# Patient Record
Sex: FEMALE | Race: WHITE | Employment: OTHER | URBAN - METROPOLITAN AREA
[De-identification: names, ages, dates, MRNs, and addresses within clinical notes are randomized per-mention and may not be internally consistent; named-entity substitution may affect disease eponyms.]

---

## 2017-12-12 ENCOUNTER — OFFICE VISIT (OUTPATIENT)
Dept: INTERNAL MEDICINE CLINIC | Age: 67
End: 2017-12-12

## 2017-12-12 VITALS
BODY MASS INDEX: 33.4 KG/M2 | OXYGEN SATURATION: 98 % | RESPIRATION RATE: 18 BRPM | HEART RATE: 69 BPM | WEIGHT: 170.13 LBS | DIASTOLIC BLOOD PRESSURE: 79 MMHG | HEIGHT: 60 IN | SYSTOLIC BLOOD PRESSURE: 129 MMHG | TEMPERATURE: 98 F

## 2017-12-12 DIAGNOSIS — E03.9 ACQUIRED HYPOTHYROIDISM: Chronic | ICD-10-CM

## 2017-12-12 DIAGNOSIS — Z23 ENCOUNTER FOR IMMUNIZATION: ICD-10-CM

## 2017-12-12 DIAGNOSIS — E78.00 PURE HYPERCHOLESTEROLEMIA: ICD-10-CM

## 2017-12-12 DIAGNOSIS — E55.9 VITAMIN D DEFICIENCY: Chronic | ICD-10-CM

## 2017-12-12 DIAGNOSIS — G25.0 FAMILIAL TREMOR: ICD-10-CM

## 2017-12-12 DIAGNOSIS — M54.32 SCIATICA OF LEFT SIDE: Primary | ICD-10-CM

## 2017-12-12 DIAGNOSIS — R73.09 ELEVATED GLUCOSE: ICD-10-CM

## 2017-12-12 RX ORDER — MULTIVITAMIN
1000 TABLET ORAL DAILY
COMMUNITY

## 2017-12-12 RX ORDER — LEVOTHYROXINE SODIUM 50 UG/1
50 TABLET ORAL
COMMUNITY
Start: 2017-11-09 | End: 2018-05-10 | Stop reason: SDUPTHER

## 2017-12-12 RX ORDER — CHLORHEXIDINE GLUCONATE 1.2 MG/ML
RINSE ORAL
COMMUNITY
Start: 2017-10-23 | End: 2017-12-12 | Stop reason: ALTCHOICE

## 2017-12-12 RX ORDER — CHOLECALCIFEROL (VITAMIN D3) 125 MCG
4000 CAPSULE ORAL DAILY
COMMUNITY

## 2017-12-12 NOTE — MR AVS SNAPSHOT
Visit Information Date & Time Provider Department Dept. Phone Encounter #  
 12/12/2017  1:30 PM Bolivar Gandhi MD [facility] [address] [id number] Follow-up Instructions Return in about 1 year (around 12/12/2018). Upcoming Health Maintenance Date Due Hepatitis C Screening 1950 DTaP/Tdap/Td series (1 - Tdap) 11/4/1969 FOBT Q 1 YEAR AGE 50-75 11/4/1998 ZOSTER VACCINE AGE 60> 9/4/2008 GLAUCOMA SCREENING Q2Y 11/4/2013 OSTEOPOROSIS SCREENING (DEXA) 11/4/2013 Pneumococcal 65+ Low/Medium Risk (1 of 2 - PCV13) 11/4/2013 MEDICARE YEARLY EXAM 11/4/2013 Influenza Age 5 to Adult 8/1/2017 Allergies as of 12/12/2017  Review Complete On: 12/12/2017 By: Bolivar Gandhi MD  
  
 Severity Noted Reaction Type Reactions Avelox [Moxifloxacin]  03/09/2016    Shortness of Breath, Palpitations Minocycline  12/12/2017    Other (comments) dysphagia Current Immunizations  Reviewed on 12/12/2017 Name Date Influenza High Dose Vaccine PF  Incomplete Pneumococcal Conjugate (PCV-13) 11/1/2017 Reviewed by Bolivar Gandhi MD on 12/12/2017 at  1:56 PM  
You Were Diagnosed With   
  
 Codes Comments Encounter for immunization     ICD-10-CM: R11 ICD-9-CM: V03.89 Vitals BP Pulse Temp Resp Height(growth percentile) Weight(growth percentile) 129/79 (BP 1 Location: Left arm, BP Patient Position: Sitting) 69 98 °F (36.7 °C) (Oral) 18 5' (1.524 m) 170 lb 2 oz (77.2 kg) SpO2 BMI OB Status Smoking Status 98% 33.23 kg/m2 Postmenopausal Never Smoker Vitals History BMI and BSA Data Body Mass Index Body Surface Area  
 33.23 kg/m 2 1.81 m 2 Preferred Pharmacy Pharmacy Name Phone [id number] [address] , [address] [place] [address]  [phone] Your Updated Medication List  
  
   
This list is accurate as of: 12/12/17  2:10 PM.  Always use your most recent med list.  
  
  
  
  
 BIOTIN (BULK) Take 5,000 mcg by mouth daily. CINNAMON 500 mg Cap Generic drug:  cinnamon bark Take 1,000 mg by mouth daily. levothyroxine 50 mcg tablet Commonly known as:  SYNTHROID Take 50 mcg by mouth Daily (before breakfast). VITAMIN D3 2,000 unit Tab Generic drug:  cholecalciferol (vitamin D3) Take 4,000 Units by mouth daily. We Performed the Following INFLUENZA VIRUS VACCINE, HIGH DOSE SEASONAL, PRESERVATIVE FREE [34458 CPT(R)] Follow-up Instructions Return in about 1 year (around 12/12/2018). Introducing [facility]! [facility] introduces [employer] patient portal. Now you can access parts of your medical record, email your doctor's office, and request medication refills online. 1. In your internet browser, go to [email or web address] 2. Click on the First Time User? Click Here link in the Sign In box. You will see the New Member Sign Up page. 3. Enter your [employer] Access Code exactly as it appears below. You will not need to use this code after youve completed the sign-up process. If you do not sign up before the expiration date, you must request a new code. · [employer] Access Code: [email or web address] Expires: 3/12/2018  1:15 PM 
 
4. Enter the last four digits of your Social Security Number (xxxx) and Date of Birth (mm/dd/yyyy) as indicated and click Submit. You will be taken to the next sign-up page. 5. Create a [employer] ID. This will be your [employer] login ID and cannot be changed, so think of one that is secure and easy to remember. 6. Create a [employer] password. You can change your password at any time. 7. Enter your Password Reset Question and Answer. This can be used at a later time if you forget your password. 8. Enter your e-mail address. You will receive e-mail notification when new information is available in [address] 9. Click Sign Up. You can now view and download portions of your medical record. 10. Click the Download Summary menu link to download a portable copy of your medical information. If you have questions, please visit the Frequently Asked Questions section of the Inter[employer] website. Remember, [employer] is NOT to be used for urgent needs. For medical emergencies, dial 911. Now available from your iPhone and Android! Please provide this summary of care documentation to your next provider. Your primary care clinician is listed as Alicia Choi. If you have any questions after today's visit, please call [phone].

## 2017-12-12 NOTE — PROGRESS NOTES
HISTORY OF PRESENT ILLNESS  Kiya Molina is a 69 y.o. female. HPI  new to my practice  Transferring care from Dr. John Briones. Last evaluated there 2  months ago. Previous medical records are not available for my review at this visit. Thyroid Disease:  Kiya Molina is a 69 y.o. female here for follow up of hypothyroidism. No results found for: TSH, TSH2, TSH3, TSHP, TSHEXT  Residual symptoms weight loss. mild  she denies denies fatigue, weight changes, heat/cold intolerance, bowel/skin changes or CVS symptoms  Thyroid medication has been unchanged since last medication check and labs. History or L sided sciatica - recurrent over many years. 3-4 times/ year. Burning in L buttocks. She has been thru PT.  5-6 years ago. No weakness, numbness, tingling in L leg. Prediabetes: Kiya Molina is here for follow up of elevated fasting sugars and prediabetes. she has been counseled before on low carb/ low concentrated sweets diet and is not following that plan. further diabetic ROS: no polyuria or polydipsia, no chest pain, dyspnea or TIAs . No results found for: GLU, GLUCPOC  No results found for: HBA1C, HGBE8, KAH9TWFZ, SVX2XWLF  Last Point of Care HGB A1C  No results found for: [id number]     Hyperlipidemia:  Kiya Molina is following up on her dyslipidemia. Cardiovascular risks for her are: LDL goal is under 130.    Currently she takes cinnamin  No results found for: CHOL, CHOLX, CHLST, CHOLV, 932306, HDL, LDL, LDLC, DLDLP, TGLX, TRIGL, TRIGP, CHHD, CHHDX  No results found for: GPT, ALT, SGOT, GGT, GGTP, AP, APIT, APX, CBIL, TBIL, TBILI              Patient Active Problem List   Diagnosis Code    Acquired hypothyroidism E03.9    Vitamin D deficiency E55.9     Past Surgical History:   Procedure Laterality Date    HX  SECTION      x2    HX DILATION AND CURETTAGE      x1    HX GYN  2015    RAMSEY Valentin     Social History     Social History    Marital status:      Spouse name: N/A  Number of children: N/A    Years of education: N/A     Occupational History    Not on file. Social History Main Topics    Smoking status: Never Smoker    Smokeless tobacco: Never Used    Alcohol use No    Drug use: No    Sexual activity: Yes     Partners: Male     Other Topics Concern    Not on file     Social History Narrative     Family History   Problem Relation Age of Onset    Hypertension Mother     Arthritis-osteo Mother     Elevated Lipids Mother     Hypertension Father     Arthritis-osteo Father     Elevated Lipids Sister     Hypertension Sister     Arthritis-osteo Brother     Elevated Lipids Brother     Elevated Lipids Sister     Elevated Lipids Sister     Arthritis-osteo Sister     Hypertension Sister     Thyroid Disease Sister     Cancer Neg Hx     Diabetes Neg Hx      Current Outpatient Prescriptions   Medication Sig    levothyroxine (SYNTHROID) 50 mcg tablet Take 50 mcg by mouth Daily (before breakfast).  cholecalciferol, vitamin D3, (VITAMIN D3) 2,000 unit tab Take 4,000 Units by mouth daily.  BIOTIN, BULK, Take 5,000 mcg by mouth daily.  cinnamon bark (CINNAMON) 500 mg cap Take 1,000 mg by mouth daily. No current facility-administered medications for this visit. Allergies   Allergen Reactions    Avelox [Moxifloxacin] Shortness of Breath and Palpitations    Minocycline Other (comments)     dysphagia     Immunization History   Administered Date(s) Administered    Pneumococcal Conjugate (PCV-13) 11/01/2017           Review of Systems   Constitutional: Negative for malaise/fatigue and weight loss. HENT: Negative for congestion and sore throat. Eyes: Negative for blurred vision. Respiratory: Negative for cough, shortness of breath and wheezing. Cardiovascular: Negative for chest pain, palpitations and leg swelling. Gastrointestinal: Negative for abdominal pain, blood in stool, constipation, diarrhea, heartburn, nausea and vomiting. Genitourinary: Negative for dysuria and hematuria. Musculoskeletal: Positive for back pain (L sided low back to buttock pain intermittant for years). Negative for joint pain. Skin: Negative for itching and rash. Neurological: Positive for tremors (head  chronic and diagnosed as familial tremor by Dr. Eva Briseno). Negative for dizziness, tingling, sensory change, focal weakness and headaches. Endo/Heme/Allergies: Negative for environmental allergies. Does not bruise/bleed easily. Psychiatric/Behavioral: Negative for depression and substance abuse. The patient is not nervous/anxious and does not have insomnia. Physical Exam   Constitutional: She appears well-developed and well-nourished. No distress. /79 (BP 1 Location: Left arm, BP Patient Position: Sitting)  Pulse 69  Temp 98 °F (36.7 °C) (Oral)   Resp 18  Ht 5' (1.524 m)  Wt 170 lb 2 oz (77.2 kg)  SpO2 98%  BMI 33.23 kg/m2Body mass index is 33.23 kg/(m^2). HENT:   Mouth/Throat: Oropharynx is clear and moist.   Eyes: Pupils are equal, round, and reactive to light. No scleral icterus. Neck: No JVD present. Carotid bruit is not present. No thyromegaly present. Cardiovascular: Normal rate, regular rhythm, normal heart sounds and intact distal pulses. Pulmonary/Chest: Effort normal and breath sounds normal.   Musculoskeletal: She exhibits no edema. Neurological: She is alert. She displays tremor (intermittant fine head tremor). She displays no atrophy. She exhibits normal muscle tone. Coordination and gait normal.   Skin: Skin is warm and dry. She is not diaphoretic. Psychiatric: She has a normal mood and affect. Her behavior is normal. Judgment and thought content normal.   Nursing note and vitals reviewed. ASSESSMENT and PLAN  Diagnoses and all orders for this visit:    1. Sciatica of left side  Consider return to PT for stretching exercises, Diane exercises and or yoga.       2. Encounter for immunization  - Influenza virus vaccine ([address]) 65 years and older (04362)    3. Acquired hypothyroidism - Well controlled and stable. her medications were reviewed and refilled where necessary as noted below. Labs ordered as noted. Get last labs from Dr. Kelin Forrester    4. Vitamin D deficiency  Get last lab check from Dr. Kelin Forrester    5. Pure hypercholesterolemia -recheck on no medications. I suspect her pooled cohort 10 year risk calculation is low and not indicating need for statin  -     LIPID PANEL    6. Elevated glucose - recheck. We discussed diet management of her prediabetes or diabetes. she is advised to reduce complex carbs/ starches, avoid breads if possible and avoid concentrated sweets and drinks. -     HEMOGLOBIN A1C WITH EAG    7. Familial tremor - she declines medication for this as it is not bothersome for her. Follow-up Disposition:  Return in about 1 year (around 12/12/2018).

## 2017-12-21 ENCOUNTER — OFFICE VISIT (OUTPATIENT)
Dept: INTERNAL MEDICINE CLINIC | Age: 67
End: 2017-12-21

## 2017-12-21 VITALS
WEIGHT: 171 LBS | DIASTOLIC BLOOD PRESSURE: 74 MMHG | SYSTOLIC BLOOD PRESSURE: 128 MMHG | OXYGEN SATURATION: 98 % | RESPIRATION RATE: 16 BRPM | TEMPERATURE: 97.9 F | HEART RATE: 57 BPM | BODY MASS INDEX: 33.57 KG/M2 | HEIGHT: 60 IN

## 2017-12-21 DIAGNOSIS — J32.9 SINUSITIS, UNSPECIFIED CHRONICITY, UNSPECIFIED LOCATION: Primary | ICD-10-CM

## 2017-12-21 DIAGNOSIS — J32.9 SINUSITIS, UNSPECIFIED CHRONICITY, UNSPECIFIED LOCATION: ICD-10-CM

## 2017-12-21 RX ORDER — AMOXICILLIN AND CLAVULANATE POTASSIUM 875; 125 MG/1; MG/1
1 TABLET, FILM COATED ORAL EVERY 12 HOURS
Qty: 20 TAB | Refills: 1 | Status: SHIPPED | OUTPATIENT
Start: 2017-12-21 | End: 2017-12-31

## 2017-12-21 RX ORDER — AMOXICILLIN AND CLAVULANATE POTASSIUM 875; 125 MG/1; MG/1
1 TABLET, FILM COATED ORAL EVERY 12 HOURS
Qty: 20 TAB | Refills: 0 | Status: SHIPPED | OUTPATIENT
Start: 2017-12-21 | End: 2017-12-21 | Stop reason: SDUPTHER

## 2017-12-21 NOTE — MR AVS SNAPSHOT
Visit Information Date & Time Provider Department Dept. Phone Encounter #  
 12/21/2017 11:15 AM Len Sloan MD [facility] [address] [id number] Upcoming Health Maintenance Date Due Hepatitis C Screening 1950 DTaP/Tdap/Td series (1 - Tdap) 11/4/1969 FOBT Q 1 YEAR AGE 50-75 11/4/1998 ZOSTER VACCINE AGE 60> 9/4/2008 GLAUCOMA SCREENING Q2Y 11/4/2013 OSTEOPOROSIS SCREENING (DEXA) 11/4/2013 MEDICARE YEARLY EXAM 11/4/2013 Pneumococcal 65+ Low/Medium Risk (2 of 2 - PPSV23) 11/1/2018 Allergies as of 12/21/2017  Review Complete On: 89/74/4767 By: Kevin Roach Severity Noted Reaction Type Reactions Avelox [Moxifloxacin]  03/09/2016    Shortness of Breath, Palpitations Minocycline  12/12/2017    Other (comments) dysphagia Current Immunizations  Reviewed on 12/12/2017 Name Date Influenza High Dose Vaccine PF 12/12/2017 Pneumococcal Conjugate (PCV-13) 11/1/2017 Not reviewed this visit You Were Diagnosed With   
  
 Codes Comments Sinusitis, unspecified chronicity, unspecified location    -  Primary ICD-10-CM: J32.9 ICD-9-CM: 473.9 Vitals BP Pulse Temp Resp Height(growth percentile) Weight(growth percentile) 128/74 (BP 1 Location: Left arm, BP Patient Position: Sitting) (!) 57 97.9 °F (36.6 °C) (Oral) 16 5' (1.524 m) 171 lb (77.6 kg) SpO2 BMI OB Status Smoking Status 98% 33.4 kg/m2 Postmenopausal Never Smoker Vitals History BMI and BSA Data Body Mass Index Body Surface Area  
 33.4 kg/m 2 1.81 m 2 Preferred Pharmacy Pharmacy Name Phone [id number] [address] , [address] [place] [address]  [phone] Your Updated Medication List  
  
   
This list is accurate as of: 12/21/17 11:53 AM.  Always use your most recent med list.  
  
  
  
  
 amoxicillin-clavulanate 875-125 mg per tablet Commonly known as:  AUGMENTIN  
 Take 1 Tab by mouth every twelve (12) hours for 10 days. BIOTIN (BULK) Take 5,000 mcg by mouth daily. CINNAMON 500 mg Cap Generic drug:  cinnamon bark Take 1,000 mg by mouth daily. guaiFENesin 1,200 mg Ta12 ER tablet Commonly known as:  Almas & Almas Take 1 Tab by mouth two (2) times a day for 10 days. levothyroxine 50 mcg tablet Commonly known as:  SYNTHROID Take 50 mcg by mouth Daily (before breakfast). VITAMIN D3 2,000 unit Tab Generic drug:  cholecalciferol (vitamin D3) Take 4,000 Units by mouth daily. Prescriptions Sent to Pharmacy Refills  
 guaiFENesin (MUCINEX) 1,200 mg Ta12 ER tablet 1 Sig: Take 1 Tab by mouth two (2) times a day for 10 days. Class: Normal  
 Pharmacy: [id number] Ph #: [phone] Route: Oral  
 amoxicillin-clavulanate (AUGMENTIN) 875-125 mg per tablet 0 Sig: Take 1 Tab by mouth every twelve (12) hours for 10 days. Class: Normal  
 Pharmacy: [id number]- [id number] Ph #: [phone] Route: Oral  
  
Patient Instructions Sinusitis: Care Instructions Your Care Instructions Sinusitis is an infection of the lining of the sinus cavities in your head. Sinusitis often follows a cold. It causes pain and pressure in your head and face. In most cases, sinusitis gets better on its own in 1 to 2 weeks. But some mild symptoms may last for several weeks. Sometimes antibiotics are needed. Follow-up care is a key part of your treatment and safety. Be sure to make and go to all appointments, and call your doctor if you are having problems. It's also a good idea to know your test results and keep a list of the medicines you take. How can you care for yourself at home? · Take an over-the-counter pain medicine, such as acetaminophen (Tylenol), ibuprofen (Advil, Motrin), or naproxen (Aleve).  Read and follow all instructions on the label. · If the doctor prescribed antibiotics, take them as directed. Do not stop taking them just because you feel better. You need to take the full course of antibiotics. · Be careful when taking over-the-counter cold or flu medicines and Tylenol at the same time. Many of these medicines have acetaminophen, which is Tylenol. Read the labels to make sure that you are not taking more than the recommended dose. Too much acetaminophen (Tylenol) can be harmful. · Breathe warm, moist air from a steamy shower, a hot bath, or a sink filled with hot water. Avoid cold, dry air. Using a humidifier in your home may help. Follow the directions for cleaning the machine. · Use saline (saltwater) nasal washes to help keep your nasal passages open and wash out mucus and bacteria. You can buy saline nose drops at a grocery store or drugstore. Or you can make your own at home by adding 1 teaspoon of salt and 1 teaspoon of baking soda to 2 cups of distilled water. If you make your own, fill a bulb syringe with the solution, insert the tip into your nostril, and squeeze gently. Lorrin Fraction your nose. · Put a hot, wet towel or a warm gel pack on your face 3 or 4 times a day for 5 to 10 minutes each time. · Try a decongestant nasal spray like oxymetazoline (Afrin). Do not use it for more than 3 days in a row. Using it for more than 3 days can make your congestion worse. When should you call for help? Call your doctor now or seek immediate medical care if: 
? · You have new or worse swelling or redness in your face or around your eyes. ? · You have a new or higher fever. ? Watch closely for changes in your health, and be sure to contact your doctor if: 
? · You have new or worse facial pain. ? · The mucus from your nose becomes thicker (like pus) or has new blood in it. ? · You are not getting better as expected. Where can you learn more? Go to http://maria r-courtney.info/. Enter M560 in the search box to learn more about \"Sinusitis: Care Instructions. \" Current as of: May 12, 2017 Content Version: 11.4 © 9398-1460 Healthwise, [employer]. Care instructions adapted under license by [employer] (which disclaims liability or warranty for this information). If you have questions about a medical condition or this instruction, always ask your healthcare professional. [address] any warranty or liability for your use of this information. Introducing [facility]! [facility] introduces [employer] patient portal. Now you can access parts of your medical record, email your doctor's office, and request medication refills online. 1. In your internet browser, go to [email or web address] 2. Click on the First Time User? Click Here link in the Sign In box. You will see the New Member Sign Up page. 3. Enter your [employer] Access Code exactly as it appears below. You will not need to use this code after youve completed the sign-up process. If you do not sign up before the expiration date, you must request a new code. · [employer] Access Code: [email or web address] Expires: 3/12/2018  1:15 PM 
 
4. Enter the last four digits of your Social Security Number (xxxx) and Date of Birth (mm/dd/yyyy) as indicated and click Submit. You will be taken to the next sign-up page. 5. Create a [employer] ID. This will be your [employer] login ID and cannot be changed, so think of one that is secure and easy to remember. 6. Create a [employer] password. You can change your password at any time. 7. Enter your Password Reset Question and Answer. This can be used at a later time if you forget your password. 8. Enter your e-mail address. You will receive e-mail notification when new information is available in [address] 9. Click Sign Up. You can now view and download portions of your medical record. 10. Click the Download Summary menu link to download a portable copy of your medical information. If you have questions, please visit the Frequently Asked Questions section of the [employer] website. Remember, [employer] is NOT to be used for urgent needs. For medical emergencies, dial 911. Now available from your iPhone and Android! Please provide this summary of care documentation to your next provider. Your primary care clinician is listed as Cade Gamble. If you have any questions after today's visit, please call [phone].

## 2017-12-21 NOTE — PATIENT INSTRUCTIONS
Sinusitis: Care Instructions  Your Care Instructions    Sinusitis is an infection of the lining of the sinus cavities in your head. Sinusitis often follows a cold. It causes pain and pressure in your head and face. In most cases, sinusitis gets better on its own in 1 to 2 weeks. But some mild symptoms may last for several weeks. Sometimes antibiotics are needed. Follow-up care is a key part of your treatment and safety. Be sure to make and go to all appointments, and call your doctor if you are having problems. It's also a good idea to know your test results and keep a list of the medicines you take. How can you care for yourself at home? · Take an over-the-counter pain medicine, such as acetaminophen (Tylenol), ibuprofen (Advil, Motrin), or naproxen (Aleve). Read and follow all instructions on the label. · If the doctor prescribed antibiotics, take them as directed. Do not stop taking them just because you feel better. You need to take the full course of antibiotics. · Be careful when taking over-the-counter cold or flu medicines and Tylenol at the same time. Many of these medicines have acetaminophen, which is Tylenol. Read the labels to make sure that you are not taking more than the recommended dose. Too much acetaminophen (Tylenol) can be harmful. · Breathe warm, moist air from a steamy shower, a hot bath, or a sink filled with hot water. Avoid cold, dry air. Using a humidifier in your home may help. Follow the directions for cleaning the machine. · Use saline (saltwater) nasal washes to help keep your nasal passages open and wash out mucus and bacteria. You can buy saline nose drops at a grocery store or drugstore. Or you can make your own at home by adding 1 teaspoon of salt and 1 teaspoon of baking soda to 2 cups of distilled water. If you make your own, fill a bulb syringe with the solution, insert the tip into your nostril, and squeeze gently. Nadia Hoganderick your nose.   · Put a hot, wet towel or a warm gel pack on your face 3 or 4 times a day for 5 to 10 minutes each time. · Try a decongestant nasal spray like oxymetazoline (Afrin). Do not use it for more than 3 days in a row. Using it for more than 3 days can make your congestion worse. When should you call for help? Call your doctor now or seek immediate medical care if:  ? · You have new or worse swelling or redness in your face or around your eyes. ? · You have a new or higher fever. ? Watch closely for changes in your health, and be sure to contact your doctor if:  ? · You have new or worse facial pain. ? · The mucus from your nose becomes thicker (like pus) or has new blood in it. ? · You are not getting better as expected. Where can you learn more? Go to http://maria r-courtney.info/. Enter G835 in the search box to learn more about \"Sinusitis: Care Instructions. \"  Current as of: May 12, 2017  Content Version: 11.4  © 5614-7561 Healthwise, Incorporated. Care instructions adapted under license by [employer] (which disclaims liability or warranty for this information). If you have questions about a medical condition or this instruction, always ask your healthcare professional. [address] any warranty or liability for your use of this information.

## 2017-12-21 NOTE — PROGRESS NOTES
Geoffrey Boyd is a 69 y.o. female who presents today for Sinus Pain; Headache; and Nasal Congestion  . She has a history of   Patient Active Problem List   Diagnosis Code    Acquired hypothyroidism E03.9    Vitamin D deficiency E55.9    Pure hypercholesterolemia E78.00    Familial tremor G25.0   . Today patient is here for an acute visit. she does not have other concerns. Upper respiratory illness:  Geoffrey Boyd presents with complaints of congestion, myalgias, headache and left sinus pain for 3 weeks. no nausea and no vomiting . she has not had  fever and chills. Symptoms are moderate. Over-the-counter remedies including Mucinex, humidifier, sinus rinse. Drinking plenty of fluids: yes  Asthma?:  No  non-smoker  No sick contacts. Notes some issues for 4-5 mos last year. Required several rounds of abx. ROS  Review of Systems   Constitutional: Negative for chills, fever and weight loss. HENT: Positive for congestion and sinus pain. Negative for ear discharge, ear pain, nosebleeds and tinnitus. Eyes: Negative for blurred vision, double vision and photophobia. Respiratory: Negative for cough, hemoptysis, sputum production, shortness of breath and wheezing. Cardiovascular: Negative for chest pain, palpitations, orthopnea and leg swelling. Gastrointestinal: Negative for abdominal pain, nausea and vomiting. Genitourinary: Negative for dysuria, frequency, hematuria and urgency. Musculoskeletal: Negative for back pain, myalgias and neck pain. Skin: Negative for itching and rash. Neurological: Positive for headaches. Negative for dizziness, tingling, tremors, sensory change, speech change and focal weakness. Endo/Heme/Allergies: Does not bruise/bleed easily. Psychiatric/Behavioral: Negative for depression. The patient is not nervous/anxious.         Visit Vitals    /74 (BP 1 Location: Left arm, BP Patient Position: Sitting)    Pulse (!) 57    Temp 97.9 °F (36.6 °C) (Oral)    Resp 16    Ht 5' (1.524 m)    Wt 171 lb (77.6 kg)    SpO2 98%    BMI 33.4 kg/m2       Physical Exam   Constitutional: She is oriented to person, place, and time. She appears well-developed and well-nourished. No distress. HENT:   Head: Normocephalic and atraumatic. Fluid behind TM. Eyes: EOM are normal. Pupils are equal, round, and reactive to light. Cardiovascular: Normal rate and regular rhythm. No murmur heard. Pulmonary/Chest: Effort normal and breath sounds normal. No respiratory distress. Abdominal: Soft. Bowel sounds are normal. She exhibits no distension. There is no tenderness. Neurological: She is alert and oriented to person, place, and time. Skin: Skin is warm and dry. Psychiatric: She has a normal mood and affect. Her behavior is normal. Thought content normal.         Current Outpatient Prescriptions   Medication Sig    guaiFENesin (MUCINEX) 1,200 mg Ta12 ER tablet Take 1 Tab by mouth two (2) times a day for 10 days.  amoxicillin-clavulanate (AUGMENTIN) 875-125 mg per tablet Take 1 Tab by mouth every twelve (12) hours for 10 days.  levothyroxine (SYNTHROID) 50 mcg tablet Take 50 mcg by mouth Daily (before breakfast).  cholecalciferol, vitamin D3, (VITAMIN D3) 2,000 unit tab Take 4,000 Units by mouth daily.  BIOTIN, BULK, Take 5,000 mcg by mouth daily.  cinnamon bark (CINNAMON) 500 mg cap Take 1,000 mg by mouth daily. No current facility-administered medications for this visit.          Past Medical History:   Diagnosis Date    Arthritis     Back    Hypercholesterolemia     Hypothyroid     Sciatic pain     hip      Past Surgical History:   Procedure Laterality Date    HX  SECTION      x2    HX DILATION AND CURETTAGE      x1    HX GYN  2015    [facility]      Social History   Substance Use Topics    Smoking status: Never Smoker    Smokeless tobacco: Never Used    Alcohol use No      Family History   Problem Relation Age of Onset    Hypertension Mother    [facility] Arthritis-osteo Mother     Elevated Lipids Mother     Hypertension Father     Arthritis-osteo Father     Elevated Lipids Sister     Hypertension Sister    [facility] Arthritis-osteo Brother     Elevated Lipids Brother     Elevated Lipids Sister     Elevated Lipids Sister     Arthritis-osteo Sister     Hypertension Sister     Thyroid Disease Sister     Cancer Neg Hx     Diabetes Neg Hx         Allergies   Allergen Reactions    Avelox [Moxifloxacin] Shortness of Breath and Palpitations    Minocycline Other (comments)     dysphagia        Assessment/Plan  Diagnoses and all orders for this visit:    1. Sinusitis, unspecified chronicity, unspecified location - mucinex and augmentin.   -     guaiFENesin (MUCINEX) 1,200 mg Ta12 ER tablet; Take 1 Tab by mouth two (2) times a day for 10 days. -     amoxicillin-clavulanate (AUGMENTIN) 875-125 mg per tablet; Take 1 Tab by mouth every twelve (12) hours for 10 days. Follow-up Disposition:  Return if symptoms worsen or fail to improve.     Jacky Fu MD  12/21/2017

## 2018-01-09 ENCOUNTER — TELEPHONE (OUTPATIENT)
Dept: INTERNAL MEDICINE CLINIC | Age: 68
End: 2018-01-09

## 2018-01-09 RX ORDER — FLUCONAZOLE 150 MG/1
150 TABLET ORAL DAILY
Qty: 1 TAB | Refills: 0 | Status: SHIPPED | OUTPATIENT
Start: 2018-01-09 | End: 2018-01-10

## 2018-01-09 NOTE — TELEPHONE ENCOUNTER
Pt states PCP gave her an antibiotic that is now causing her to have a yeast infection. Pt is requesting diflucan to be sent to her [facility] pharmacy. Pt also is requesting if at all possible 1 refill on that rx just in case the one time  Dose doesn't work.         Pt can be reached at [phone] with any further questions or concerns

## 2018-01-09 NOTE — TELEPHONE ENCOUNTER
Pt seen by Dr Juan Antonio Hoover for sinusitis, given antibx and now with yeast infection. Will Rx diflucan 150 x 1. If not resolving, will need reevaluation in office. New medication or Refill was escribed to patient's pharmacy as requested.

## 2018-02-16 ENCOUNTER — OFFICE VISIT (OUTPATIENT)
Dept: INTERNAL MEDICINE CLINIC | Age: 68
End: 2018-02-16

## 2018-02-16 VITALS
RESPIRATION RATE: 18 BRPM | OXYGEN SATURATION: 99 % | TEMPERATURE: 97.9 F | BODY MASS INDEX: 32.44 KG/M2 | DIASTOLIC BLOOD PRESSURE: 88 MMHG | WEIGHT: 165.25 LBS | HEIGHT: 60 IN | HEART RATE: 64 BPM | SYSTOLIC BLOOD PRESSURE: 148 MMHG

## 2018-02-16 DIAGNOSIS — J30.9 CHRONIC ALLERGIC RHINITIS, UNSPECIFIED SEASONALITY, UNSPECIFIED TRIGGER: Primary | ICD-10-CM

## 2018-02-16 DIAGNOSIS — M54.5 CHRONIC LOW BACK PAIN, UNSPECIFIED BACK PAIN LATERALITY, WITH SCIATICA PRESENCE UNSPECIFIED: ICD-10-CM

## 2018-02-16 DIAGNOSIS — E03.9 ACQUIRED HYPOTHYROIDISM: Chronic | ICD-10-CM

## 2018-02-16 DIAGNOSIS — G89.29 CHRONIC LOW BACK PAIN, UNSPECIFIED BACK PAIN LATERALITY, WITH SCIATICA PRESENCE UNSPECIFIED: ICD-10-CM

## 2018-02-16 RX ORDER — MINERAL OIL
180 ENEMA (ML) RECTAL DAILY
COMMUNITY
Start: 2018-02-16 | End: 2018-02-20 | Stop reason: ALTCHOICE

## 2018-02-16 RX ORDER — MONTELUKAST SODIUM 10 MG/1
10 TABLET ORAL DAILY
Qty: 30 TAB | Refills: 0 | Status: SHIPPED | OUTPATIENT
Start: 2018-02-16 | End: 2018-02-20 | Stop reason: ALTCHOICE

## 2018-02-16 RX ORDER — FLUTICASONE PROPIONATE 50 MCG
2 SPRAY, SUSPENSION (ML) NASAL DAILY
Qty: 1 BOTTLE | COMMUNITY
Start: 2018-02-16 | End: 2018-02-20 | Stop reason: ALTCHOICE

## 2018-02-16 NOTE — PROGRESS NOTES
HISTORY OF PRESENT ILLNESS  Juan Bonilla is a 69 y.o. female. HPI  Upper respiratory illness:  Juan Bonilla presents with complaints of congestion, post nasal drip, generalized sinus pain previously and bilateral ear dizziness is associated with head movements which has been present for 4  weeks for 4 weeks. no nausea and no vomiting . she has not had  night sweats, dry cough, productive cough, fever and chills. Symptoms are mild. Over-the-counter remedies including allegra, mucinex    has been used with poor relief of symptoms. Drinking plenty of fluids: yes  Asthma?:  no  non-smoker  Similar symptoms last year. Hx of sinusitis in Dec.  Seen by Dr. Denise Hamm and ? Clearing. She stopped antibx due to yeast vaginitis. Hx of low back pain and sciatica in past. Now recurring with increased frequency. Trouble sitting without good cushioning. Thyroid Disease:  Juan Bonilla is a 69 y.o. female here for follow up of hypothyroidism. No results found for: TSH, TSH2, TSH3, TSHP, TSHEXT  Residual symptoms denies fatigue, weight changes, heat/cold intolerance, bowel/skin changes or CVS symptoms. she denies denies fatigue, weight changes, heat/cold intolerance, bowel/skin changes or CVS symptoms  Thyroid medication has been unchanged since last medication check and labs. Patient Active Problem List   Diagnosis Code    Acquired hypothyroidism E03.9    Vitamin D deficiency E55.9    Pure hypercholesterolemia E78.00    Familial tremor G25.0     Past Medical History:   Diagnosis Date    Arthritis     Back    Hypercholesterolemia     Hypothyroid     Sciatic pain     hip     Allergies   Allergen Reactions    Avelox [Moxifloxacin] Shortness of Breath and Palpitations    Minocycline Other (comments)     dysphagia     Current Outpatient Prescriptions on File Prior to Visit   Medication Sig Dispense Refill    levothyroxine (SYNTHROID) 50 mcg tablet Take 50 mcg by mouth Daily (before breakfast).       cholecalciferol, vitamin D3, (VITAMIN D3) 2,000 unit tab Take 4,000 Units by mouth daily.  BIOTIN, BULK, Take 5,000 mcg by mouth daily.  cinnamon bark (CINNAMON) 500 mg cap Take 1,000 mg by mouth daily. No current facility-administered medications on file prior to visit. Social History   Substance Use Topics    Smoking status: Never Smoker    Smokeless tobacco: Never Used    Alcohol use No         ROS    Physical Exam   Constitutional: She is oriented to person, place, and time. She appears well-developed and well-nourished. No distress. /88 (BP 1 Location: Left arm, BP Patient Position: Sitting)  Pulse 64  Temp 97.9 °F (36.6 °C) (Oral)   Resp 18  Ht 5' (1.524 m)  Wt 165 lb 4 oz (75 kg)  SpO2 99%  BMI 32.27 kg/m2   HENT:   Right Ear: Tympanic membrane and ear canal normal.   Left Ear: Tympanic membrane and ear canal normal.   Nose: No mucosal edema or rhinorrhea. Right sinus exhibits no maxillary sinus tenderness and no frontal sinus tenderness. Left sinus exhibits no maxillary sinus tenderness and no frontal sinus tenderness. Mouth/Throat: Uvula is midline and mucous membranes are normal. No oropharyngeal exudate, posterior oropharyngeal edema, posterior oropharyngeal erythema or tonsillar abscesses. Eyes: Conjunctivae are normal.   Neck: Neck supple. No thyromegaly present. Cardiovascular: Normal rate, regular rhythm and normal heart sounds. Pulmonary/Chest: Effort normal and breath sounds normal. No respiratory distress. She has no wheezes. She has no rales. Lymphadenopathy:     She has no cervical adenopathy. Neurological: She is alert and oriented to person, place, and time. Skin: Skin is warm and dry. She is not diaphoretic. Nursing note and vitals reviewed. ASSESSMENT and PLAN  Diagnoses and all orders for this visit:    1. Chronic allergic rhinitis, unspecified seasonality, unspecified trigger -no evidence of acute sinusitis.   Treat at allergic rhinitis . Allegra, flonase nasal, singulair. Recheck in 3 weeks. -     montelukast (SINGULAIR) 10 mg tablet; Take 1 Tab by mouth daily. 2. Acquired hypothyroidism - Well controlled and stable. her medications were reviewed and refilled where necessary as noted below. Labs ordered as noted. 3. Chronic low back pain, unspecified back pain laterality, with sciatica presence unspecified -she has failed PT. 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   Refer to ortho spine now for reassessment.  -     REFERRAL TO ORTHOPEDIC SURGERY      Follow-up Disposition: Not on File

## 2018-02-16 NOTE — MR AVS SNAPSHOT
[address] [facility] 
 
 
 [address] [place], [place] [address] [facility] 
[phone] Patient: Gema Nuñez MRN: [id number] Visit Information Date & Time Provider Department Dept. Phone Encounter #  
 2/16/2018  8:00 AM Ricardo Cummings MD [facility] [address] [id number] Upcoming Health Maintenance Date Due Hepatitis C Screening 1950 DTaP/Tdap/Td series (1 - Tdap) 11/4/1969 BREAST CANCER SCRN MAMMOGRAM 11/4/1998 FOBT Q 1 YEAR AGE 50-75 11/4/1998 ZOSTER VACCINE AGE 60> 9/4/2008 GLAUCOMA SCREENING Q2Y 11/4/2013 OSTEOPOROSIS SCREENING (DEXA) 11/4/2013 MEDICARE YEARLY EXAM 11/4/2013 Pneumococcal 65+ Low/Medium Risk (2 of 2 - PPSV23) 11/1/2018 Allergies as of 2/16/2018  Review Complete On: 1/9/2018 By: Ricardo Cummings MD  
  
 Severity Noted Reaction Type Reactions Avelox [Moxifloxacin]  03/09/2016    Shortness of Breath, Palpitations Minocycline  12/12/2017    Other (comments) dysphagia Current Immunizations  Reviewed on 12/12/2017 Name Date Influenza High Dose Vaccine PF 12/12/2017 Pneumococcal Conjugate (PCV-13) 11/1/2017 Not reviewed this visit You Were Diagnosed With   
  
 Codes Comments Chronic allergic rhinitis, unspecified seasonality, unspecified trigger    -  Primary ICD-10-CM: J30.9 ICD-9-CM: 477.9 Acquired hypothyroidism     ICD-10-CM: E03.9 ICD-9-CM: 915. 9 Vitals BP Pulse Temp Resp Height(growth percentile) Weight(growth percentile) 148/88 (BP 1 Location: Left arm, BP Patient Position: Sitting) 64 97.9 °F (36.6 °C) (Oral) 18 5' (1.524 m) 165 lb 4 oz (75 kg) SpO2 BMI OB Status Smoking Status 99% 32.27 kg/m2 Postmenopausal Never Smoker BMI and BSA Data Body Mass Index Body Surface Area  
 32.27 kg/m 2 1.78 m 2 Preferred Pharmacy Pharmacy Name Phone [facility]/PHARMACY #[id number]- [address]  [phone] Your Updated Medication List  
  
   
This list is accurate as of: 2/16/18  8:27 AM.  Always use your most recent med list.  
  
  
  
  
 BIOTIN (BULK) Take 5,000 mcg by mouth daily. CINNAMON 500 mg Cap Generic drug:  cinnamon bark Take 1,000 mg by mouth daily. fexofenadine 180 mg tablet Commonly known as:  Marilee Fabiola Take 1 Tab by mouth daily. fluticasone 50 mcg/actuation nasal spray Commonly known as:  Enrique Oats 2 Sprays by Both Nostrils route daily. levothyroxine 50 mcg tablet Commonly known as:  SYNTHROID Take 50 mcg by mouth Daily (before breakfast). montelukast 10 mg tablet Commonly known as:  SINGULAIR Take 1 Tab by mouth daily. VITAMIN D3 2,000 unit Tab Generic drug:  cholecalciferol (vitamin D3) Take 4,000 Units by mouth daily. Prescriptions Sent to Pharmacy Refills  
 montelukast (SINGULAIR) 10 mg tablet 0 Sig: Take 1 Tab by mouth daily. Class: Normal  
 Pharmacy: [facility]/pharmacy #[id number]- [address] [facility], [address] [place] [address]  Ph #: [phone] Route: Oral  
  
Introducing [facility]! Caitie Siddiqui introduces Kartela patient portal. Now you can access parts of your medical record, email your doctor's office, and request medication refills online. 1. In your internet browser, go to [email or web address] 2. Click on the First Time User? Click Here link in the Sign In box. You will see the New Member Sign Up page. 3. Enter your Kartela Access Code exactly as it appears below. You will not need to use this code after youve completed the sign-up process. If you do not sign up before the expiration date, you must request a new code. · Kartela Access Code: [email or web address] Expires: 3/12/2018  1:15 PM 
 
4.  Enter the last four digits of your Social Security Number (xxxx) and Date of Birth (mm/dd/yyyy) as indicated and click Submit. You will be taken to the next sign-up page. 5. Create a VoltServer ID. This will be your VoltServer login ID and cannot be changed, so think of one that is secure and easy to remember. 6. Create a VoltServer password. You can change your password at any time. 7. Enter your Password Reset Question and Answer. This can be used at a later time if you forget your password. 8. Enter your e-mail address. You will receive e-mail notification when new information is available in [address] 9. Click Sign Up. You can now view and download portions of your medical record. 10. Click the Download Summary menu link to download a portable copy of your medical information. If you have questions, please visit the Frequently Asked Questions section of the VoltServer website. Remember, VoltServer is NOT to be used for urgent needs. For medical emergencies, dial 911. Now available from your iPhone and Android! Please provide this summary of care documentation to your next provider. Your primary care clinician is listed as Poonam Ashley. If you have any questions after today's visit, please call [phone].

## 2018-02-20 ENCOUNTER — OFFICE VISIT (OUTPATIENT)
Dept: INTERNAL MEDICINE CLINIC | Age: 68
End: 2018-02-20

## 2018-02-20 VITALS
DIASTOLIC BLOOD PRESSURE: 78 MMHG | HEART RATE: 60 BPM | BODY MASS INDEX: 32.39 KG/M2 | WEIGHT: 165 LBS | HEIGHT: 60 IN | TEMPERATURE: 98 F | RESPIRATION RATE: 16 BRPM | SYSTOLIC BLOOD PRESSURE: 141 MMHG | OXYGEN SATURATION: 98 %

## 2018-02-20 DIAGNOSIS — J34.89 SINUS PRESSURE: Primary | ICD-10-CM

## 2018-02-20 RX ORDER — ACETAMINOPHEN 325 MG/1
TABLET ORAL
COMMUNITY

## 2018-02-20 RX ORDER — GUAIFENESIN, PSEUDOEPHEDRINE HYDROCHLORIDE 600; 60 MG/1; MG/1
1 TABLET, EXTENDED RELEASE ORAL EVERY 12 HOURS
COMMUNITY

## 2018-02-20 RX ORDER — TRIAMCINOLONE ACETONIDE 55 UG/1
2 SPRAY, METERED NASAL DAILY
COMMUNITY

## 2018-02-20 NOTE — PROGRESS NOTES
Meño Miner is a 69 y.o. female who presents today for Sinus Pain and Dizziness  . She has a history of   Patient Active Problem List   Diagnosis Code    Acquired hypothyroidism E03.9    Vitamin D deficiency E55.9    Pure hypercholesterolemia E78.00    Familial tremor G25.0   . Today patient is here for an acute visit. she does not have other concerns. URI: Pt with recurrent URI/Sinus issues. Seem by me in Dec. Given abx (which she did not finish). Seen by PCP on  2/16 and added Singulair. No changes with this. Since seeing Dr. Erica Carter, has gotten worse. Notes that singulair may have cause her some swelling of sinuses. Started Mucinex and Phenylephrine yesterday. CT in 2017 showing L mucosal thickening. No cough or fevers/chills. ROS  Review of Systems   Constitutional: Negative for chills, fever and weight loss. HENT: Positive for congestion, sinus pain and sore throat. Negative for ear discharge, ear pain, hearing loss, nosebleeds and tinnitus. Respiratory: Negative for cough, shortness of breath and stridor. Cardiovascular: Negative for chest pain, palpitations, orthopnea and leg swelling. Gastrointestinal: Negative for abdominal pain, nausea and vomiting. Genitourinary: Negative for dysuria, frequency and urgency. Neurological: Positive for dizziness. Negative for tingling, tremors, sensory change, speech change and headaches. Endo/Heme/Allergies: Does not bruise/bleed easily. Psychiatric/Behavioral: Negative for depression. The patient is not nervous/anxious. Visit Vitals    /78 (BP 1 Location: Left arm, BP Patient Position: Sitting)    Pulse 60    Temp 98 °F (36.7 °C) (Oral)    Resp 16    Ht 5' (1.524 m)    Wt 165 lb (74.8 kg)    SpO2 98%    BMI 32.22 kg/m2       Physical Exam   Constitutional: She appears well-developed and well-nourished. HENT:   Head: Normocephalic and atraumatic.    Right Ear: External ear normal.   Left Ear: External ear normal.   Pressure behind both TM. Pulmonary/Chest: Effort normal and breath sounds normal.   Abdominal: Soft. Bowel sounds are normal.         Current Outpatient Prescriptions   Medication Sig    triamcinolone (NASACORT AQ) 55 mcg nasal inhaler 2 Sprays daily.  PSEUDOEPHEDRINE-guaiFENesin (MUCINEX D)  mg per tablet Take 1 Tab by mouth every twelve (12) hours.  acetaminophen (TYLENOL) 325 mg tablet Take  by mouth every four (4) hours as needed for Pain.  guaiFENesin (MUCINEX) 1,200 mg Ta12 ER tablet Take 1 Tab by mouth two (2) times a day for 5 days.  levothyroxine (SYNTHROID) 50 mcg tablet Take 50 mcg by mouth Daily (before breakfast).  cholecalciferol, vitamin D3, (VITAMIN D3) 2,000 unit tab Take 4,000 Units by mouth daily.  BIOTIN, BULK, Take 5,000 mcg by mouth daily.  cinnamon bark (CINNAMON) 500 mg cap Take 1,000 mg by mouth daily. No current facility-administered medications for this visit.          Past Medical History:   Diagnosis Date    Arthritis     Back    Hypercholesterolemia     Hypothyroid     Sciatic pain     hip      Past Surgical History:   Procedure Laterality Date    HX  SECTION      x2    HX DILATION AND CURETTAGE      x1    [facility]      Social History   Substance Use Topics    Smoking status: Never Smoker    Smokeless tobacco: Never Used    Alcohol use No      Family History   Problem Relation Age of Onset    Hypertension Mother    Angela Lua Arthritis-osteo Mother     Elevated Lipids Mother     Hypertension Father     Arthritis-osteo Father     Elevated Lipids Sister     Hypertension Sister     Arthritis-osteo Brother     Elevated Lipids Brother     Elevated Lipids Sister     Elevated Lipids Sister     Arthritis-osteo Sister     Hypertension Sister     Thyroid Disease Sister     Cancer Neg Hx     Diabetes Neg Hx         Allergies   Allergen Reactions    Avelox [Moxifloxacin] Shortness of Breath and Palpitations    Minocycline Other (comments)     dysphagia        Assessment/Plan  Diagnoses and all orders for this visit:    1. Sinus pressure - urged to stay on mucinex and Nasal steroid. Continue nasal steroid after this. No s/s of active sinus infection.   -     guaiFENesin (MUCINEX) 1,200 mg Ta12 ER tablet; Take 1 Tab by mouth two (2) times a day for 5 days. Follow-up Disposition:  Return if symptoms worsen or fail to improve.     Lindsey Packer MD  2/20/2018

## 2018-02-20 NOTE — PATIENT INSTRUCTIONS
Sinusitis: Care Instructions  Your Care Instructions    Sinusitis is an infection of the lining of the sinus cavities in your head. Sinusitis often follows a cold. It causes pain and pressure in your head and face. In most cases, sinusitis gets better on its own in 1 to 2 weeks. But some mild symptoms may last for several weeks. Sometimes antibiotics are needed. Follow-up care is a key part of your treatment and safety. Be sure to make and go to all appointments, and call your doctor if you are having problems. It's also a good idea to know your test results and keep a list of the medicines you take. How can you care for yourself at home? · Take an over-the-counter pain medicine, such as acetaminophen (Tylenol), ibuprofen (Advil, Motrin), or naproxen (Aleve). Read and follow all instructions on the label. · If the doctor prescribed antibiotics, take them as directed. Do not stop taking them just because you feel better. You need to take the full course of antibiotics. · Be careful when taking over-the-counter cold or flu medicines and Tylenol at the same time. Many of these medicines have acetaminophen, which is Tylenol. Read the labels to make sure that you are not taking more than the recommended dose. Too much acetaminophen (Tylenol) can be harmful. · Breathe warm, moist air from a steamy shower, a hot bath, or a sink filled with hot water. Avoid cold, dry air. Using a humidifier in your home may help. Follow the directions for cleaning the machine. · Use saline (saltwater) nasal washes to help keep your nasal passages open and wash out mucus and bacteria. You can buy saline nose drops at a grocery store or drugstore. Or you can make your own at home by adding 1 teaspoon of salt and 1 teaspoon of baking soda to 2 cups of distilled water. If you make your own, fill a bulb syringe with the solution, insert the tip into your nostril, and squeeze gently. Isael Evener your nose.   · Put a hot, wet towel or a warm gel pack on your face 3 or 4 times a day for 5 to 10 minutes each time. · Try a decongestant nasal spray like oxymetazoline (Afrin). Do not use it for more than 3 days in a row. Using it for more than 3 days can make your congestion worse. When should you call for help? Call your doctor now or seek immediate medical care if:  ? · You have new or worse swelling or redness in your face or around your eyes. ? · You have a new or higher fever. ? Watch closely for changes in your health, and be sure to contact your doctor if:  ? · You have new or worse facial pain. ? · The mucus from your nose becomes thicker (like pus) or has new blood in it. ? · You are not getting better as expected. Where can you learn more? Go to http://maria r-courtney.info/. Enter Y670 in the search box to learn more about \"Sinusitis: Care Instructions. \"  Current as of: May 12, 2017  Content Version: 11.4  © 0672-4200 [employer]. Care instructions adapted under license by [employer] (which disclaims liability or warranty for this information). If you have questions about a medical condition or this instruction, always ask your healthcare professional. [address] any warranty or liability for your use of this information.

## 2018-02-20 NOTE — MR AVS SNAPSHOT
Hussein Tafoya 
 
 
 [address] [facility] Client [address] [facility] 
[phone] Patient: Michelle Dean MRN: [id number] BRO:99/5/2081 Visit Information Date & Time Provider Department Dept. Phone Encounter #  
 2/20/2018  9:30 AM Selvin Porter MD [facility] [address] [facility] [id number] Follow-up Instructions Return if symptoms worsen or fail to improve. Upcoming Health Maintenance Date Due Hepatitis C Screening 1950 DTaP/Tdap/Td series (1 - Tdap) 11/4/1969 BREAST CANCER SCRN MAMMOGRAM 11/4/1998 FOBT Q 1 YEAR AGE 50-75 11/4/1998 ZOSTER VACCINE AGE 60> 9/4/2008 GLAUCOMA SCREENING Q2Y 11/4/2013 OSTEOPOROSIS SCREENING (DEXA) 11/4/2013 MEDICARE YEARLY EXAM 11/4/2013 Pneumococcal 65+ Low/Medium Risk (2 of 2 - PPSV23) 11/1/2018 Allergies as of 2/20/2018  Review Complete On: 2/20/2018 By: Selvin Porter MD  
  
 Severity Noted Reaction Type Reactions Avelox [Moxifloxacin]  03/09/2016    Shortness of Breath, Palpitations Minocycline  12/12/2017    Other (comments) dysphagia Current Immunizations  Reviewed on 12/12/2017 Name Date Influenza High Dose Vaccine PF 12/12/2017 Pneumococcal Conjugate (PCV-13) 11/1/2017 Not reviewed this visit You Were Diagnosed With   
  
 Codes Comments Sinus pressure    -  Primary ICD-10-CM: W16.52 ICD-9-CM: 478.19 Vitals BP Pulse Temp Resp Height(growth percentile) Weight(growth percentile) 141/78 (BP 1 Location: Left arm, BP Patient Position: Sitting) 60 98 °F (36.7 °C) (Oral) 16 5' (1.524 m) 165 lb (74.8 kg) SpO2 BMI OB Status Smoking Status 98% 32.22 kg/m2 Postmenopausal Never Smoker Vitals History BMI and BSA Data Body Mass Index Body Surface Area  
 32.22 kg/m 2 1.78 m 2 Preferred Pharmacy Pharmacy Name Phone [facility]/PHARMACY #[id number]- [address]  [phone] Your Updated Medication List  
  
   
This list is accurate as of: 2/20/18 10:15 AM.  Always use your most recent med list.  
  
  
  
  
 BIOTIN (BULK) Take 5,000 mcg by mouth daily. CINNAMON 500 mg Cap Generic drug:  cinnamon bark Take 1,000 mg by mouth daily. guaiFENesin 1,200 mg Ta12 ER tablet Commonly known as:  Almas & Almas Take 1 Tab by mouth two (2) times a day for 5 days. levothyroxine 50 mcg tablet Commonly known as:  SYNTHROID Take 50 mcg by mouth Daily (before breakfast). PSEUDOEPHEDRINE-guaiFENesin  mg per tablet Commonly known as:  Almas & Almas D Take 1 Tab by mouth every twelve (12) hours. triamcinolone 55 mcg nasal inhaler Commonly known as:  NASACORT AQ  
2 Sprays daily. TYLENOL 325 mg tablet Generic drug:  acetaminophen Take  by mouth every four (4) hours as needed for Pain. VITAMIN D3 2,000 unit Tab Generic drug:  cholecalciferol (vitamin D3) Take 4,000 Units by mouth daily. Prescriptions Sent to Pharmacy Refills  
 guaiFENesin (MUCINEX) 1,200 mg Ta12 ER tablet 0 Sig: Take 1 Tab by mouth two (2) times a day for 5 days. Class: Normal  
 Pharmacy: [facility]/pharmacy #[id number]- [address] [place] #: [phone] Route: Oral  
  
Follow-up Instructions Return if symptoms worsen or fail to improve. Patient Instructions Sinusitis: Care Instructions Your Care Instructions Sinusitis is an infection of the lining of the sinus cavities in your head. Sinusitis often follows a cold. It causes pain and pressure in your head and face. In most cases, sinusitis gets better on its own in 1 to 2 weeks. But some mild symptoms may last for several weeks. Sometimes antibiotics are needed. Follow-up care is a key part of your treatment and safety.  Be sure to make and go to all appointments, and call your doctor if you are having problems. It's also a good idea to know your test results and keep a list of the medicines you take. How can you care for yourself at home? · Take an over-the-counter pain medicine, such as acetaminophen (Tylenol), ibuprofen (Advil, Motrin), or naproxen (Aleve). Read and follow all instructions on the label. · If the doctor prescribed antibiotics, take them as directed. Do not stop taking them just because you feel better. You need to take the full course of antibiotics. · Be careful when taking over-the-counter cold or flu medicines and Tylenol at the same time. Many of these medicines have acetaminophen, which is Tylenol. Read the labels to make sure that you are not taking more than the recommended dose. Too much acetaminophen (Tylenol) can be harmful. · Breathe warm, moist air from a steamy shower, a hot bath, or a sink filled with hot water. Avoid cold, dry air. Using a humidifier in your home may help. Follow the directions for cleaning the machine. · Use saline (saltwater) nasal washes to help keep your nasal passages open and wash out mucus and bacteria. You can buy saline nose drops at a grocery store or drugstore. Or you can make your own at home by adding 1 teaspoon of salt and 1 teaspoon of baking soda to 2 cups of distilled water. If you make your own, fill a bulb syringe with the solution, insert the tip into your nostril, and squeeze gently. [place]  your nose. · Put a hot, wet towel or a warm gel pack on your face 3 or 4 times a day for 5 to 10 minutes each time. · Try a decongestant nasal spray like oxymetazoline (Afrin). Do not use it for more than 3 days in a row. Using it for more than 3 days can make your congestion worse. When should you call for help? Call your doctor now or seek immediate medical care if: 
? · You have new or worse swelling or redness in your face or around your eyes. ? · You have a new or higher fever. ? Watch closely for changes in your health, and be sure to contact your doctor if: 
? · You have new or worse facial pain. ? · The mucus from your nose becomes thicker (like pus) or has new blood in it. ? · You are not getting better as expected. Where can you learn more? Go to http://maria r-courtney.info/. Enter G053 in the search box to learn more about \"Sinusitis: Care Instructions. \" Current as of: May 12, 2017 Content Version: 11.4 © 8351-6514 [employer]. Care instructions adapted under license by [employer] (which disclaims liability or warranty for this information). If you have questions about a medical condition or this instruction, always ask your healthcare professional. [address] any warranty or liability for your use of this information. Introducing [facility]! Clay Piña introduces [employer] patient portal. Now you can access parts of your medical record, email your doctor's office, and request medication refills online. 1. In your internet browser, go to [email or web address] 2. Click on the First Time User? Click Here link in the Sign In box. You will see the New Member Sign Up page. 3. Enter your [employer] Access Code exactly as it appears below. You will not need to use this code after youve completed the sign-up process. If you do not sign up before the expiration date, you must request a new code. · [employer] Access Code: [email or web address] Expires: 3/12/2018  1:15 PM 
 
4. Enter the last four digits of your Social Security Number (xxxx) and Date of Birth (mm/dd/yyyy) as indicated and click Submit. You will be taken to the next sign-up page. 5. Create a [employer] ID. This will be your [employer] login ID and cannot be changed, so think of one that is secure and easy to remember. 6. Create a [employer] password. You can change your password at any time. 7. Enter your Password Reset Question and Answer. This can be used at a later time if you forget your password. 8. Enter your e-mail address. You will receive e-mail notification when new information is available in [address] 9. Click Sign Up. You can now view and download portions of your medical record. 10. Click the Download Summary menu link to download a portable copy of your medical information. If you have questions, please visit the Frequently Asked Questions section of the [employer] website. Remember, [employer] is NOT to be used for urgent needs. For medical emergencies, dial 911. Now available from your iPhone and Android! Please provide this summary of care documentation to your next provider. Your primary care clinician is listed as Brent Angulo. If you have any questions after today's visit, please call [phone].

## 2018-04-11 ENCOUNTER — HOSPITAL ENCOUNTER (OUTPATIENT)
Dept: PHYSICAL THERAPY | Age: 68
Discharge: HOME | End: 2018-04-11
Payer: MEDICARE

## 2018-04-11 PROCEDURE — G8978 MOBILITY CURRENT STATUS: HCPCS | Performed by: PHYSICAL THERAPIST

## 2018-04-11 PROCEDURE — 97110 THERAPEUTIC EXERCISES: CPT | Performed by: PHYSICAL THERAPIST

## 2018-04-11 PROCEDURE — G8979 MOBILITY GOAL STATUS: HCPCS | Performed by: PHYSICAL THERAPIST

## 2018-04-11 PROCEDURE — 97162 PT EVAL MOD COMPLEX 30 MIN: CPT | Performed by: PHYSICAL THERAPIST

## 2018-04-11 NOTE — PROGRESS NOTES
PT INITIAL EVALUATION NOTE - [facility] 2-15    Patient Name: Michael Bustamante  Date:2018  : 1950  [x]  Patient  Verified  Payor: Daquan Laceyamando / Plan: [place] MEDICARE PART A & B / Product Type: Medicare /    In time:1:00 PM  Out time:2:00 PM  Total Treatment Time (min): 60  Total Timed Codes (min): 25  1:1 Treatment Time ( [address] only): 60   Visit #: 1     Treatment Area: Low back pain [M54.5]    SUBJECTIVE  Pain Level (0-10 scale): 1  Any medication changes, allergies to medications, adverse drug reactions, diagnosis change, or new procedure performed?: [] No    [x] Yes (see summary sheet for update)  Subjective:    Chronic left piriformis syndrome  PLOF: No limitations with sitting, driving, bending forward  Mechanism of Injury: Patient was bending forward in a garden bed 8 years ago and felt a sudden sharp pain. Since then she has had difficulty with prolonged sitting and in 2017 she sat for several hours on a hard bench at a baseball game. Since then she has had significant left glut pain and is unable to sit for more than 10 minutes at a time, unless she is in a very soft chair. Previous Treatment/Compliance: no formal treatment. She finds that ice has the best effect on her pain.   PMHx/Surgical Hx: Lumbar spondylosis, scoliosis  Work Hx: Retired  Living Situation: lives at home with   Pt Goals: to reduce pain and improve ability to sit  Barriers: chronicity, severity  Motivation: very motivated  Substance use: none  FABQ Score: elevated  Cognition: A & O x 3        OBJECTIVE/EXAMINATION  Gait and Functional Mobility:  Gait: Mild antalgic gait pattern on left  Squat: NT due to high pain    Palpation: TTP along the left lateral piriformis  Swelling:None  Joint Mobility: NT    PROM:        R  L  Hip Flexion  Hip IR   40  35  Hip ER   60  50  Hip Extension            MMT:    R  L  Hip flexors  Hip adductors  Hip abductors  4/5  3+/5  Hip extensors  4/5  3+/5    All other LE myotomes: >4/5    Neurological: Sensation:intact  Special Tests:        Yakov Shaunna: Positive        Larissa: Positive   SLR: Negative         DONTAE: Negative   FAIR:Positive   Scour:Negative   Piriformis: Positive       Modality rationale: decrease inflammation and decrease pain to improve the patients ability to sit for prolonged periods without pain   Min Type Additional Details    [] Estim: []Att   []Unatt        []TENS instruct                  []IFC  []Premod   []NMES                     []Other:  []w/US   []w/ice   []w/heat  Position:  Location:    []  Traction: [] Cervical       []Lumbar                       [] Prone          []Supine                       []Intermittent   []Continuous Lbs:  [] before manual  [] after manual  []w/heat    []  Ultrasound: []Continuous   [] Pulsed at:                           []1MHz   []3MHz Location:  W/cm2:    [] Paraffin         Location:   []w/heat    []  Ice     []  Heat  []  Ice massage Position:  Location:    []  Laser  []  Other: Position:  Location:      []  Vasopneumatic Device Pressure:       [] lo [] med [] hi   Temperature:      [x] Skin assessment post-treatment:  [x]intact []redness- no adverse reaction    []redness  adverse reaction:     15 min Therapeutic Exercise:  [x] See flow sheet :   Rationale: increase ROM, increase strength and improve coordination to improve the patients ability to sit for prolonged periods without pain    10 min Manual Therapy:   STM to the left glut/piriformis/greater trochanter   Rationale: decrease pain, increase ROM, increase tissue extensibility and decrease trigger points to improve the patients ability to sit for prolonged periods without pain          With   [] TE   [] TA   [] neuro   [] other: Patient Education: [x] Review HEP    [] Progressed/Changed HEP based on:   [] positioning   [] body mechanics   [] transfers   [] heat/ice application    [] other:      Other Objective/Functional Measures:    Pain Level (0-10 scale) post treatment: 0    ASSESSMENT/Changes in Function:     [x]  See Plan of Saad Afcarlotta, PT , DPT, OCS, Cert.  DN   4/11/2018  2:19 PM

## 2018-04-11 NOTE — PROGRESS NOTES
[address] [facility] [address]  Phone: [phone]  Fax: [phone]    Plan of Care/Statement of Necessity for Physical Therapy Services  2-15    Patient name: Khadijah Joe  : 1950  Provider#: [id number]  Referral source: Brinda Wallace MD      Medical/Treatment Diagnosis: Low back pain [M54.5]     Prior Hospitalization: see medical history     Comorbidities: Scoliosis  Prior Level of Function: see initial eval  Medications: Verified on Patient Summary List  Start of Care: 18      Onset Date: 2017   The Plan of Care and following information is based on the information from the initial evaluation. Assessment/ key information: Patient presents with left-sided sciatica with pain along the left piriformis and greater trochanter. Today she presented with impaired hip ROM, impaired hip strength, and poor lumbopelvic stability. Evaluation Complexity History MEDIUM  Complexity : 1-2 comorbidities / personal factors will impact the outcome/ POC ; Examination MEDIUM Complexity : 3 Standardized tests and measures addressing body structure, function, activity limitation and / or participation in recreation  ;Presentation MEDIUM Complexity : Evolving with changing characteristics  ; Clinical Decision Making MEDIUM Complexity : FOTO score of 26-74  Overall Complexity Rating: MEDIUM    Problem List: pain affecting function, decrease ROM, decrease strength, decrease ADL/ functional abilitiies, decrease activity tolerance, decrease flexibility/ joint mobility and decrease transfer abilities   Treatment Plan may include any combination of the following: Therapeutic exercise, Therapeutic activities, Neuromuscular re-education, Physical agent/modality, Gait/balance training, Manual therapy, Patient education, Self Care training, Functional mobility training and Home safety training  Patient / Family readiness to learn indicated by: asking questions, trying to perform skills and interest  Persons(s) to be included in education: patient (P)  Barriers to Learning/Limitations: None  Patient Goal (s): I want to be able to drive on long car rides without so much pain.   Patient Self Reported Health Status: excellent  Rehabilitation Potential: excellent    Short Term Goals: To be accomplished in 8 treatments:  1. Patient will be able to sit for 10 minutes with <2/10 glut pain. 2. Patient will be able to walk two blocks with <2/10 glut pain. 3. Patient will be able to perform a sit-to-stand transfer with <2/10 glut pain. Long Term Goals: To be accomplished in 16 treatments:  1. Patient will be able to squat and  10# from the floor with no pain or limitation. 2. Patient will be able to sit for 30 minutes with no pain or limitation. 3. Patient will be able to sleep through the night without being woken up by glut pain  Frequency / Duration: Patient to be seen 2 times per week for 8 weeks. Patient/ Caregiver education and instruction: self care, activity modification and exercises    [x]  Plan of care has been reviewed with PTA    G-Codes (GP)  Mobility   Current  CK= 40-59%   Goal  CK= 40-59%    The severity rating is based on clinical judgment and the FOTO Score score. Certification Period: 4/11/18 - 7/11/18  Sierra Guerrero, PT , DPT, OCS, Cert. DN   4/11/2018 2:14 PM    ________________________________________________________________________    I certify that the above Therapy Services are being furnished while the patient is under my care. I agree with the treatment plan and certify that this therapy is necessary.     [de-identified] Signature:____________________  Date:____________Time: _________

## 2018-04-13 ENCOUNTER — HOSPITAL ENCOUNTER (OUTPATIENT)
Dept: PHYSICAL THERAPY | Age: 68
Discharge: HOME | End: 2018-04-13
Payer: MEDICARE

## 2018-04-13 PROCEDURE — 97110 THERAPEUTIC EXERCISES: CPT

## 2018-04-13 PROCEDURE — 97140 MANUAL THERAPY 1/> REGIONS: CPT

## 2018-04-13 NOTE — PROGRESS NOTES
PT DAILY TREATMENT NOTE - [facility] 2-15    Patient Name: Michael Bustamante  Date:2018  : 1950  [x]  Patient  Verified  Payor: Daquan Laceyamando / Plan: [place] MEDICARE PART A & B / Product Type: Medicare /    In time:8:30a  Out time:9:35a  Total Treatment Time (min): 65  Total Timed Codes (min): 55  1:1 Treatment Time ( [address] only): 54   Visit #: 2     Treatment Area: Low back pain [M54.5]    SUBJECTIVE  Pain Level (0-10 scale): 1  Any medication changes, allergies to medications, adverse drug reactions, diagnosis change, or new procedure performed?: [x] No    [] Yes (see summary sheet for update)  Subjective functional status/changes:   [] No changes reported  Patient reports compliance with HEP.   Patient states her tailbone hurt some atfer last session, she thinks due to laying or sitting on the hard surface    OBJECTIVE    Modality rationale: decrease inflammation and decrease pain to improve the patients ability to sit, stand, ambulate, lift, carry, reach and complete ADL's   Min Type Additional Details    [] Estim: []Att   []Unatt        []TENS instruct                  []IFC  []Premod   []NMES                     []Other:  []w/US   []w/ice   []w/heat  Position:  Location:    []  Traction: [] Cervical       []Lumbar                       [] Prone          []Supine                       []Intermittent   []Continuous Lbs:  [] before manual  [] after manual  []w/heat    []  Ultrasound: []Continuous   [] Pulsed at:                           []1MHz   []3MHz Location:  W/cm2:    [] Paraffin         Location:   []w/heat   10 [x]  Ice     []  Heat  []  Ice massage Position:SL  Location: L hip    []  Laser  []  Other: Position:  Location:      []  Vasopneumatic Device Pressure:       [] lo [] med [] hi   Temperature:      [x] Skin assessment post-treatment:  [x]intact []redness- no adverse reaction    []redness  adverse reaction:     45 min Therapeutic Exercise:  [x] See flow sheet :   Rationale: increase ROM and increase strength to improve the patients ability to sit, stand, ambulate, lift, carry, reach and complete ADL's    10 min Manual Therapy: MFR L piriformis, glut med, rolling glut med     Rationale: decrease pain, increase ROM, increase tissue extensibility and decrease trigger points to improve the patients ability to sit, stand, ambulate, lift, carry, reach and complete ADL's    With   [] TE   [] TA   [] neuro   [] other: Patient Education: [x] Review HEP    [] Progressed/Changed HEP based on:   [] positioning   [] body mechanics   [] transfers   [] heat/ice application    [] other:      Other Objective/Functional Measures: pt janet to tolerate supine and seated exercises with pillow under torso for extra cushion      Pain Level (0-10 scale) post treatment: 0    ASSESSMENT/Changes in Function:     Patient will continue to benefit from skilled PT services to modify and progress therapeutic interventions, address functional mobility deficits, address ROM deficits, address strength deficits, analyze and address soft tissue restrictions, analyze and cue movement patterns, analyze and modify body mechanics/ergonomics and assess and modify postural abnormalities to attain remaining goals. []  See Plan of Care  []  See progress note/recertification  []  See Discharge Summary         Progress towards goals / Updated goals:  Patient demonstrates good tolerance for interventions and is able to progress several with no pain throughout. Patient required verbal cues for proper reproduction and will do well with continued focus on stability and strength.      PLAN  [x]  Upgrade activities as tolerated     [x]  Continue plan of care  []  Update interventions per flow sheet       []  Discharge due to:_  []  Other:_      Hai Cruz 4/13/2018  9:30 AM

## 2018-04-17 ENCOUNTER — HOSPITAL ENCOUNTER (OUTPATIENT)
Dept: PHYSICAL THERAPY | Age: 68
Discharge: HOME | End: 2018-04-17
Payer: MEDICARE

## 2018-04-17 PROCEDURE — 97110 THERAPEUTIC EXERCISES: CPT

## 2018-04-17 PROCEDURE — 97140 MANUAL THERAPY 1/> REGIONS: CPT

## 2018-04-17 NOTE — PROGRESS NOTES
PT DAILY TREATMENT NOTE - [facility] 2-15    Patient Name: Angelica Marquez  Date:2018  : 1950  [x]  Patient  Verified  Payor: Cookie Plan / Plan: [place] MEDICARE PART A & B / Product Type: Medicare /    In time:11:40a  Out time: 12:45a  Total Treatment Time (min): 65  Total Timed Codes (min): 55  1:1 Treatment Time ( [address] only): 30   Visit #: 3     Treatment Area: Low back pain [M54.5]    SUBJECTIVE  Pain Level (0-10 scale): 1  Any medication changes, allergies to medications, adverse drug reactions, diagnosis change, or new procedure performed?: [x] No    [] Yes (see summary sheet for update)  Subjective functional status/changes:   [] No changes reported  Patient reports she had some stinging pain in the middle of her back when she woke after the last session and feels the pelvic tilts might have caused it.     OBJECTIVE    Modality rationale: decrease inflammation and decrease pain to improve the patients ability to sit, stand, ambulate, lift, carry, reach and complete ADL's   Min Type Additional Details    [] Estim: []Att   []Unatt        []TENS instruct                  []IFC  []Premod   []NMES                     []Other:  []w/US   []w/ice   []w/heat  Position:  Location:    []  Traction: [] Cervical       []Lumbar                       [] Prone          []Supine                       []Intermittent   []Continuous Lbs:  [] before manual  [] after manual  []w/heat    []  Ultrasound: []Continuous   [] Pulsed at:                           []1MHz   []3MHz Location:  W/cm2:    [] Paraffin         Location:   []w/heat   10 [x]  Ice     []  Heat  []  Ice massage Position:SL  Location: L hip    []  Laser  []  Other: Position:  Location:      []  Vasopneumatic Device Pressure:       [] lo [] med [] hi   Temperature:      [x] Skin assessment post-treatment:  [x]intact []redness- no adverse reaction    []redness  adverse reaction:     45 min Therapeutic Exercise:  [x] See flow sheet :   Rationale: increase ROM and increase strength to improve the patients ability to sit, stand, ambulate, lift, carry, reach and complete ADL's    10 min Manual Therapy: MFR L piriformis, glut med, rolling glut med, ITB and quads      Rationale: decrease pain, increase ROM, increase tissue extensibility and decrease trigger points to improve the patients ability to sit, stand, ambulate, lift, carry, reach and complete ADL's    With   [] TE   [] TA   [] neuro   [] other: Patient Education: [x] Review HEP    [] Progressed/Changed HEP based on:   [] positioning   [] body mechanics   [] transfers   [] heat/ice application    [] other:      Other Objective/Functional Measures: pt able to tolerate supine and seated exercises with pillow under torso for extra cushion, no pain with interventions, will update HEP at next visit. Pain Level (0-10 scale) post treatment: 0    ASSESSMENT/Changes in Function:     Patient will continue to benefit from skilled PT services to modify and progress therapeutic interventions, address functional mobility deficits, address ROM deficits, address strength deficits, analyze and address soft tissue restrictions, analyze and cue movement patterns, analyze and modify body mechanics/ergonomics and assess and modify postural abnormalities to attain remaining goals. []  See Plan of Care  []  See progress note/recertification  []  See Discharge Summary         Progress towards goals / Updated goals:  Patient demonstrates good tolerance for interventions and is able to progress several with no pain throughout. Patient required verbal cues for proper reproduction and will do well with continued focus on hip stability and strength.      PLAN  [x]  Upgrade activities as tolerated     [x]  Continue plan of care  []  Update interventions per flow sheet       []  Discharge due to:_  []  Other:_      Karmen Carr 4/17/2018  11:40 AM

## 2018-04-19 ENCOUNTER — HOSPITAL ENCOUNTER (OUTPATIENT)
Dept: PHYSICAL THERAPY | Age: 68
Discharge: HOME | End: 2018-04-19
Payer: MEDICARE

## 2018-04-19 PROCEDURE — 97110 THERAPEUTIC EXERCISES: CPT

## 2018-04-19 PROCEDURE — 97140 MANUAL THERAPY 1/> REGIONS: CPT

## 2018-04-19 NOTE — PROGRESS NOTES
PT DAILY TREATMENT NOTE - [facility] 2-15    Patient Name: Valeriy Austin  Date:2018  : 1950  [x]  Patient  Verified  Payor: Murcia Keenan / Plan: [place] MED[place] B / Product Type: Medicare /    In time:10:40a  Out time: 11:45a  Total Treatment Time (min): 65  Total Timed Codes (min): 55  1:1 Treatment Time ( [address] only): 30   Visit #: 3     Treatment Area: Low back pain [M54.5]    SUBJECTIVE  Pain Level (0-10 scale): 0  Any medication changes, allergies to medications, adverse drug reactions, diagnosis change, or new procedure performed?: [x] No    [] Yes (see summary sheet for update)  Subjective functional status/changes:   [] No changes reported  Patient reports she has not had any issues since last session. Patient states she has been having some muscular soreness.      OBJECTIVE    Modality rationale: decrease inflammation and decrease pain to improve the patients ability to sit, stand, ambulate, lift, carry, reach and complete ADL's   Min Type Additional Details    [] Estim: []Att   []Unatt        []TENS instruct                  []IFC  []Premod   []NMES                     []Other:  []w/US   []w/ice   []w/heat  Position:  Location:    []  Traction: [] Cervical       []Lumbar                       [] Prone          []Supine                       []Intermittent   []Continuous Lbs:  [] before manual  [] after manual  []w/heat    []  Ultrasound: []Continuous   [] Pulsed at:                           []1MHz   []3MHz Location:  W/cm2:    [] Paraffin         Location:   []w/heat   10 [x]  Ice     []  Heat  []  Ice massage Position:SL  Location: L hip    []  Laser  []  Other: Position:  Location:      []  Vasopneumatic Device Pressure:       [] lo [] med [] hi   Temperature:      [x] Skin assessment post-treatment:  [x]intact []redness- no adverse reaction    []redness  adverse reaction:     45 min Therapeutic Exercise:  [x] See flow sheet :   Rationale: increase ROM and increase strength to improve the patients ability to sit, stand, ambulate, lift, carry, reach and complete ADL's    10 min Manual Therapy: MFR L piriformis, glut med, rolling glut med, ITB and quads      Rationale: decrease pain, increase ROM, increase tissue extensibility and decrease trigger points to improve the patients ability to sit, stand, ambulate, lift, carry, reach and complete ADL's    With   [] TE   [] TA   [] neuro   [] other: Patient Education: [x] Review HEP    [] Progressed/Changed HEP based on:   [] positioning   [] body mechanics   [] transfers   [] heat/ice application    [] other:      Other Objective/Functional Measures: HEP updated and given to pt, pt will be traveling this weekend and reports some anxiety concerning sitting in the car for several hours, discussed activities to de and taking frequent stops to prevent increased pain and stiffness. Pain Level (0-10 scale) post treatment: 0    ASSESSMENT/Changes in Function:     Patient will continue to benefit from skilled PT services to modify and progress therapeutic interventions, address functional mobility deficits, address ROM deficits, address strength deficits, analyze and address soft tissue restrictions, analyze and cue movement patterns, analyze and modify body mechanics/ergonomics and assess and modify postural abnormalities to attain remaining goals. []  See Plan of Care  []  See progress note/recertification  []  See Discharge Summary         Progress towards goals / Updated goals:   Patient able to advance several exercises with no pain throughout and is making good progress towards goals. Patient continues to require verbal cues for proper exercise reproduction and pain free ROM.     PLAN  [x]  Upgrade activities as tolerated     [x]  Continue plan of care  []  Update interventions per flow sheet       []  Discharge due to:_  []  Other:_      Mose Primrose 4/19/2018  11:40 AM

## 2018-04-24 ENCOUNTER — HOSPITAL ENCOUNTER (OUTPATIENT)
Dept: PHYSICAL THERAPY | Age: 68
Discharge: HOME | End: 2018-04-24
Payer: MEDICARE

## 2018-04-24 PROCEDURE — 97110 THERAPEUTIC EXERCISES: CPT | Performed by: PHYSICAL THERAPIST

## 2018-04-24 PROCEDURE — 97140 MANUAL THERAPY 1/> REGIONS: CPT | Performed by: PHYSICAL THERAPIST

## 2018-04-24 NOTE — PROGRESS NOTES
PT DAILY TREATMENT NOTE - [facility] 2-15    Patient Name: Trent Sterling  Date:2018  : 1950  [x]  Patient  Verified  Payor: [place] MEDICARE / Plan: [place] MEDICARE PART A & B / Product Type: Medicare /    In time:10:30 AM  Out time: 11:35 AM  Total Treatment Time (min): 65  Total Timed Codes (min): 55  1:1 Treatment Time ( only): 40  Visit #: 4     Treatment Area: Low back pain [M54.5]    SUBJECTIVE  Pain Level (0-10 scale): 0  Any medication changes, allergies to medications, adverse drug reactions, diagnosis change, or new procedure performed?: [x] No    [] Yes (see summary sheet for update)  Subjective functional status/changes:   [] No changes reported  Patient reports similar low levels of left hip pain throughout the day and continues to need to use her pillow when sitting for prolonged periods.     OBJECTIVE    Modality rationale: decrease inflammation and decrease pain to improve the patients ability to sit, stand, ambulate, lift, carry, reach and complete ADL's   Min Type Additional Details    [] Estim: []Att   []Unatt        []TENS instruct                  []IFC  []Premod   []NMES                     []Other:  []w/US   []w/ice   []w/heat  Position:  Location:    []  Traction: [] Cervical       []Lumbar                       [] Prone          []Supine                       []Intermittent   []Continuous Lbs:  [] before manual  [] after manual  []w/heat    []  Ultrasound: []Continuous   [] Pulsed at:                           []1MHz   []3MHz Location:  W/cm2:    [] Paraffin         Location:   []w/heat   10 [x]  Ice     []  Heat  []  Ice massage Position:SL  Location: L hip    []  Laser  []  Other: Position:  Location:      []  Vasopneumatic Device Pressure:       [] lo [] med [] hi   Temperature:      [x] Skin assessment post-treatment:  [x]intact []redness- no adverse reaction    []redness  adverse reaction:     45 min Therapeutic Exercise:  [x] See flow sheet :   Rationale: increase ROM and increase strength to improve the patients ability to sit, stand, ambulate, lift, carry, reach and complete ADL's    10 min Manual Therapy: MFR L piriformis, glut med, rolling glut med, ITB and quads      Rationale: decrease pain, increase ROM, increase tissue extensibility and decrease trigger points to improve the patients ability to sit, stand, ambulate, lift, carry, reach and complete ADL's    With   [] TE   [] TA   [] neuro   [] other: Patient Education: [x] Review HEP    [] Progressed/Changed HEP based on:   [] positioning   [] body mechanics   [] transfers   [] heat/ice application    [] other:      Other Objective/Functional Measures:     Pain Level (0-10 scale) post treatment: 0    ASSESSMENT/Changes in Function:     Patient will continue to benefit from skilled PT services to modify and progress therapeutic interventions, address functional mobility deficits, address ROM deficits, address strength deficits, analyze and address soft tissue restrictions, analyze and cue movement patterns, analyze and modify body mechanics/ergonomics and assess and modify postural abnormalities to attain remaining goals. []  See Plan of Care  []  See progress note/recertification  []  See Discharge Summary         Progress towards goals / Updated goals:   Patient continues to tolerate a steady progression of therapeutic exercises well, but has yet to show significant progress towards functional goals of sustained pain relief in between sessions. PLAN  [x]  Upgrade activities as tolerated     [x]  Continue plan of care  []  Update interventions per flow sheet       []  Discharge due to:_  []  Other:_      Riverton Mon, PT , DPT, OCS, Cert.  DN   4/24/2018  11:40 AM

## 2018-04-26 ENCOUNTER — HOSPITAL ENCOUNTER (OUTPATIENT)
Dept: PHYSICAL THERAPY | Age: 68
Discharge: HOME | End: 2018-04-26
Payer: MEDICARE

## 2018-04-26 PROCEDURE — 97110 THERAPEUTIC EXERCISES: CPT | Performed by: PHYSICAL THERAPIST

## 2018-04-26 PROCEDURE — 97140 MANUAL THERAPY 1/> REGIONS: CPT | Performed by: PHYSICAL THERAPIST

## 2018-04-26 NOTE — PROGRESS NOTES
PT DAILY TREATMENT NOTE - [facility] 2-15    Patient Name: Donta Crisostomo  Date:2018  : 1950  [x]  Patient  Verified  Payor: Jn Fraction / Plan: [place] MEDICARE PART A & B / Product Type: Medicare /    In time:11:30 AM  Out time: 12:35 PM  Total Treatment Time (min): 65  Total Timed Codes (min): 55  1:1 Treatment Time ( [address] only): 55  Visit #: 6     Treatment Area: Low back pain [M54.5]    SUBJECTIVE  Pain Level (0-10 scale): 2  Any medication changes, allergies to medications, adverse drug reactions, diagnosis change, or new procedure performed?: [x] No    [] Yes (see summary sheet for update)  Subjective functional status/changes:   [] No changes reported  Patient reports that the pain levels continues to remain unchanged, however her hip feels stronger.     OBJECTIVE    Modality rationale: decrease inflammation and decrease pain to improve the patients ability to sit, stand, ambulate, lift, carry, reach and complete ADL's   Min Type Additional Details    [] Estim: []Att   []Unatt        []TENS instruct                  []IFC  []Premod   []NMES                     []Other:  []w/US   []w/ice   []w/heat  Position:  Location:    []  Traction: [] Cervical       []Lumbar                       [] Prone          []Supine                       []Intermittent   []Continuous Lbs:  [] before manual  [] after manual  []w/heat    []  Ultrasound: []Continuous   [] Pulsed at:                           []1MHz   []3MHz Location:  W/cm2:    [] Paraffin         Location:   []w/heat   10 []  Ice     [x]  Heat  []  Ice massage Position:SL  Location: L hip    []  Laser  []  Other: Position:  Location:      []  Vasopneumatic Device Pressure:       [] lo [] med [] hi   Temperature:      [x] Skin assessment post-treatment:  [x]intact []redness- no adverse reaction    []redness  adverse reaction:     45 min Therapeutic Exercise:  [x] See flow sheet :   Rationale: increase ROM and increase strength to improve the patients ability to sit, stand, ambulate, lift, carry, reach and complete ADL's    10 min Manual Therapy: MFR L piriformis, glut med, rolling glut med, ITB and quads      Rationale: decrease pain, increase ROM, increase tissue extensibility and decrease trigger points to improve the patients ability to sit, stand, ambulate, lift, carry, reach and complete ADL's    With   [] TE   [] TA   [] neuro   [] other: Patient Education: [x] Review HEP    [] Progressed/Changed HEP based on:   [] positioning   [] body mechanics   [] transfers   [] heat/ice application    [] other:      Other Objective/Functional Measures:     Pain Level (0-10 scale) post treatment: 0    ASSESSMENT/Changes in Function:     Patient will continue to benefit from skilled PT services to modify and progress therapeutic interventions, address functional mobility deficits, address ROM deficits, address strength deficits, analyze and address soft tissue restrictions, analyze and cue movement patterns, analyze and modify body mechanics/ergonomics and assess and modify postural abnormalities to attain remaining goals. []  See Plan of Care  []  See progress note/recertification  []  See Discharge Summary         Progress towards goals / Updated goals:   Patient tolerated today's progression of therapeutic exercises well and will continue to focus on improving SLS stability and eccentric hamstring strength. PLAN  [x]  Upgrade activities as tolerated     [x]  Continue plan of care  []  Update interventions per flow sheet       []  Discharge due to:_  []  Other:_      Sierra Guerrero, PT , DPT, OCS, Cert.  DN   4/26/2018  11:40 AM

## 2018-05-01 ENCOUNTER — HOSPITAL ENCOUNTER (OUTPATIENT)
Dept: PHYSICAL THERAPY | Age: 68
Discharge: HOME | End: 2018-05-01
Payer: MEDICARE

## 2018-05-01 PROCEDURE — 97140 MANUAL THERAPY 1/> REGIONS: CPT

## 2018-05-01 PROCEDURE — 97110 THERAPEUTIC EXERCISES: CPT

## 2018-05-01 NOTE — PROGRESS NOTES
PT DAILY TREATMENT NOTE - [facility] 2-15    Patient Name: Evelin Conception  Date:2018  : 1950  [x]  Patient  Verified  Payor: Ruben Damon / Plan: [place] MEDICARE PART A & B / Product Type: Medicare /    In time:11:35 AM  Out time: 12:40 PM  Total Treatment Time (min): 65  Total Timed Codes (min): 55  1:1 Treatment Time ( [address] only): 55  Visit #: 7     Treatment Area: Low back pain [M54.5]    SUBJECTIVE  Pain Level (0-10 scale): 2  Any medication changes, allergies to medications, adverse drug reactions, diagnosis change, or new procedure performed?: [x] No    [] Yes (see summary sheet for update)  Subjective functional status/changes:   [] No changes reported  Patient reports she noticed she has more pain when she is lying in bed rather than the couch which is more firm than her bed. Patient states she has been mattress shopping and is looking to get a more firm mattress.     OBJECTIVE    Modality rationale: decrease inflammation and decrease pain to improve the patients ability to sit, stand, ambulate, lift, carry, reach and complete ADL's   Min Type Additional Details    [] Estim: []Att   []Unatt        []TENS instruct                  []IFC  []Premod   []NMES                     []Other:  []w/US   []w/ice   []w/heat  Position:  Location:    []  Traction: [] Cervical       []Lumbar                       [] Prone          []Supine                       []Intermittent   []Continuous Lbs:  [] before manual  [] after manual  []w/heat    []  Ultrasound: []Continuous   [] Pulsed at:                           []1MHz   []3MHz Location:  W/cm2:    [] Paraffin         Location:   []w/heat   10 [x]  Ice     []  Heat  []  Ice massage Position:SL  Location: L hip    []  Laser  []  Other: Position:  Location:      []  Vasopneumatic Device Pressure:       [] lo [] med [] hi   Temperature:      [x] Skin assessment post-treatment:  [x]intact []redness- no adverse reaction    []redness  adverse reaction:     45 min Therapeutic Exercise:  [x] See flow sheet :   Rationale: increase ROM and increase strength to improve the patients ability to sit, stand, ambulate, lift, carry, reach and complete ADL's    10 min Manual Therapy: MFR L piriformis, glut med, rolling glut med, ITB and quads      Rationale: decrease pain, increase ROM, increase tissue extensibility and decrease trigger points to improve the patients ability to sit, stand, ambulate, lift, carry, reach and complete ADL's    With   [] TE   [] TA   [] neuro   [] other: Patient Education: [x] Review HEP    [] Progressed/Changed HEP based on:   [] positioning   [] body mechanics   [] transfers   [] heat/ice application    [] other:      Other Objective/Functional Measures: pt reports soreness by end of session    Pain Level (0-10 scale) post treatment: 0     ASSESSMENT/Changes in Function:     Patient will continue to benefit from skilled PT services to modify and progress therapeutic interventions, address functional mobility deficits, address ROM deficits, address strength deficits, analyze and address soft tissue restrictions, analyze and cue movement patterns, analyze and modify body mechanics/ergonomics and assess and modify postural abnormalities to attain remaining goals. []  See Plan of Care  []  See progress note/recertification  []  See Discharge Summary         Progress towards goals / Updated goals:   Patient did well with all interventions with increased fatigue and muscle soreness noted by end of session. Patient making good progress towards goals and will do well with continued focus on hamstring eccentric strength.     PLAN  [x]  Upgrade activities as tolerated     [x]  Continue plan of care  []  Update interventions per flow sheet       []  Discharge due to:_  []  Other:_      Chrystal Fermin PTA  5/1/2018  11:40 AM

## 2018-05-03 ENCOUNTER — HOSPITAL ENCOUNTER (OUTPATIENT)
Dept: PHYSICAL THERAPY | Age: 68
Discharge: HOME | End: 2018-05-03
Payer: MEDICARE

## 2018-05-03 PROCEDURE — 97110 THERAPEUTIC EXERCISES: CPT

## 2018-05-03 PROCEDURE — 97140 MANUAL THERAPY 1/> REGIONS: CPT

## 2018-05-03 NOTE — PROGRESS NOTES
PT DAILY TREATMENT NOTE - [facility] 2-15    Patient Name: aDkota Alan  Date:5/3/2018  : 1950  [x]  Patient  Verified  Payor: [place] MEDICARE / Plan: [place] MEDICARE PART A & B / Product Type: Medicare /    In time:11:00 AM  Out time: 12:05p  Total Treatment Time (min): 65  Total Timed Codes (min): 55  1:1 Treatment Time ( [address] only): 55  Visit #: 8     Treatment Area: Low back pain [M54.5]    SUBJECTIVE  Pain Level (0-10 scale): 0  Any medication changes, allergies to medications, adverse drug reactions, diagnosis change, or new procedure performed?: [x] No    [] Yes (see summary sheet for update)  Subjective functional status/changes:   [] No changes reported  Patient reports she has been feeling good the last several days and has only had some pulling pain in L hip when lying on R side.     OBJECTIVE    Modality rationale: decrease inflammation and decrease pain to improve the patients ability to sit, stand, ambulate, lift, carry, reach and complete ADL's   Min Type Additional Details    [] Estim: []Att   []Unatt        []TENS instruct                  []IFC  []Premod   []NMES                     []Other:  []w/US   []w/ice   []w/heat  Position:  Location:    []  Traction: [] Cervical       []Lumbar                       [] Prone          []Supine                       []Intermittent   []Continuous Lbs:  [] before manual  [] after manual  []w/heat    []  Ultrasound: []Continuous   [] Pulsed at:                           []1MHz   []3MHz Location:  W/cm2:    [] Paraffin         Location:   []w/heat   10 [x]  Ice     []  Heat  []  Ice massage Position:SL  Location: L hip    []  Laser  []  Other: Position:  Location:      []  Vasopneumatic Device Pressure:       [] lo [] med [] hi   Temperature:      [x] Skin assessment post-treatment:  [x]intact []redness- no adverse reaction    []redness  adverse reaction:     45 min Therapeutic Exercise:  [x] See flow sheet :   Rationale: increase ROM and increase strength to improve the patients ability to sit, stand, ambulate, lift, carry, reach and complete ADL's    10 min Manual Therapy: MFR L piriformis, glut med; rolling glut med, ITB and quads      Rationale: decrease pain, increase ROM, increase tissue extensibility and decrease trigger points to improve the patients ability to sit, stand, ambulate, lift, carry, reach and complete ADL's    With   [] TE   [] TA   [] neuro   [] other: Patient Education: [x] Review HEP    [] Progressed/Changed HEP based on:   [] positioning   [] body mechanics   [] transfers   [] heat/ice application    [] other:      Other Objective/Functional Measures: mod fatigue noted with hip hinges    Pain Level (0-10 scale) post treatment: 0     ASSESSMENT/Changes in Function:     Patient will continue to benefit from skilled PT services to modify and progress therapeutic interventions, address functional mobility deficits, address ROM deficits, address strength deficits, analyze and address soft tissue restrictions, analyze and cue movement patterns, analyze and modify body mechanics/ergonomics and assess and modify postural abnormalities to attain remaining goals. []  See Plan of Care  []  See progress note/recertification  []  See Discharge Summary         Progress towards goals / Updated goals:   Patient did well with all interventions with increased fatigue and muscle soreness noted by end of session. Patient making good progress towards goals and will do well with continued focus on hamstring eccentric strength.     PLAN  [x]  Upgrade activities as tolerated     [x]  Continue plan of care  []  Update interventions per flow sheet       []  Discharge due to:_  []  Other:_      Hai Cruz PTA  5/3/2018  11:40 AM

## 2018-05-08 ENCOUNTER — HOSPITAL ENCOUNTER (OUTPATIENT)
Dept: PHYSICAL THERAPY | Age: 68
Discharge: HOME | End: 2018-05-08
Payer: MEDICARE

## 2018-05-08 PROCEDURE — 97140 MANUAL THERAPY 1/> REGIONS: CPT

## 2018-05-08 PROCEDURE — 97110 THERAPEUTIC EXERCISES: CPT

## 2018-05-08 NOTE — PROGRESS NOTES
PT DAILY TREATMENT NOTE - [facility] 2-15    Patient Name: Anita Malave  Date:2018  : 1950  [x]  Patient  Verified  Payor: [place] MEDICARE / Plan: [place] MEDICARE PART A & B / Product Type: Medicare /    In time:12:05p  Out time: 1:05p  Total Treatment Time (min): 60  Total Timed Codes (min): 60  1:1 Treatment Time ( only): 25  Visit #: 9     Treatment Area: Low back pain [M54.5]    SUBJECTIVE  Pain Level (0-10 scale): 0  Any medication changes, allergies to medications, adverse drug reactions, diagnosis change, or new procedure performed?: [x] No    [] Yes (see summary sheet for update)  Subjective functional status/changes:   [] No changes reported  Patient reports she has been feeling good the last several days and has only had some pulling pain in L hip when lying on R side.     OBJECTIVE    Modality rationale: declined   Min Type Additional Details    [] Estim: []Att   []Unatt        []TENS instruct                  []IFC  []Premod   []NMES                     []Other:  []w/US   []w/ice   []w/heat  Position:  Location:    []  Traction: [] Cervical       []Lumbar                       [] Prone          []Supine                       []Intermittent   []Continuous Lbs:  [] before manual  [] after manual  []w/heat    []  Ultrasound: []Continuous   [] Pulsed at:                           []1MHz   []3MHz Location:  W/cm2:    [] Paraffin         Location:   []w/heat    []  Ice     []  Heat  []  Ice massage Position:SL  Location: L hip    []  Laser  []  Other: Position:  Location:      []  Vasopneumatic Device Pressure:       [] lo [] med [] hi   Temperature:      [x] Skin assessment post-treatment:  [x]intact []redness- no adverse reaction    []redness  adverse reaction:     50 min Therapeutic Exercise:  [x] See flow sheet :   Rationale: increase ROM and increase strength to improve the patients ability to sit, stand, ambulate, lift, carry, reach and complete ADL's    10 min Manual Therapy: MFR L piriformis, glut med; rolling glut med, ITB and quads      Rationale: decrease pain, increase ROM, increase tissue extensibility and decrease trigger points to improve the patients ability to sit, stand, ambulate, lift, carry, reach and complete ADL's    With   [] TE   [] TA   [] neuro   [] other: Patient Education: [x] Review HEP    [] Progressed/Changed HEP based on:   [] positioning   [] body mechanics   [] transfers   [] heat/ice application    [] other:      Other Objective/Functional Measures: mod fatigue noted with hip hinges    Pain Level (0-10 scale) post treatment: 0     ASSESSMENT/Changes in Function:     Patient will continue to benefit from skilled PT services to modify and progress therapeutic interventions, address functional mobility deficits, address ROM deficits, address strength deficits, analyze and address soft tissue restrictions, analyze and cue movement patterns, analyze and modify body mechanics/ergonomics and assess and modify postural abnormalities to attain remaining goals. []  See Plan of Care  []  See progress note/recertification  []  See Discharge Summary         Progress towards goals / Updated goals:   Patient did well with all interventions with increased fatigue and muscle soreness noted by end of session. Patient making good progress towards goals and will do well with continued focus on hamstring eccentric strength.     PLAN  [x]  Upgrade activities as tolerated     [x]  Continue plan of care  []  Update interventions per flow sheet       []  Discharge due to:_  []  Other:_      Clarisa Clark PTA  5/8/2018  11:40 AM

## 2018-05-10 ENCOUNTER — HOSPITAL ENCOUNTER (OUTPATIENT)
Dept: PHYSICAL THERAPY | Age: 68
Discharge: HOME | End: 2018-05-10
Payer: MEDICARE

## 2018-05-10 PROCEDURE — 97110 THERAPEUTIC EXERCISES: CPT

## 2018-05-10 PROCEDURE — G8979 MOBILITY GOAL STATUS: HCPCS | Performed by: PHYSICAL THERAPIST

## 2018-05-10 PROCEDURE — G8978 MOBILITY CURRENT STATUS: HCPCS | Performed by: PHYSICAL THERAPIST

## 2018-05-10 PROCEDURE — 97140 MANUAL THERAPY 1/> REGIONS: CPT

## 2018-05-10 RX ORDER — LEVOTHYROXINE SODIUM 50 UG/1
50 TABLET ORAL
Qty: 90 TAB | Refills: 1 | Status: SHIPPED | OUTPATIENT
Start: 2018-05-10 | End: 2018-11-08 | Stop reason: SDUPTHER

## 2018-05-10 NOTE — PROGRESS NOTES
PT DAILY TREATMENT NOTE - [facility] 2-15    Patient Name: Nisreen Palomino  Date:5/10/2018  : 1950  [x]  Patient  Verified  Payor: [place] MEDICARE / Plan: [place] MEDICARE PART A & B / Product Type: Medicare /    In time:11:00a  Out time: 12:10p  Total Treatment Time (min): 70  Total Timed Codes (min): 60  1:1 Treatment Time ([facility] only): 55  Visit #: 10     Treatment Area: Low back pain [M54.5]    SUBJECTIVE  Pain Level (0-10 scale): 0  Any medication changes, allergies to medications, adverse drug reactions, diagnosis change, or new procedure performed?: [x] No    [] Yes (see summary sheet for update)  Subjective functional status/changes:   [] No changes reported  Patient reports no pain through back, but does have some pain in her knee.       OBJECTIVE    Modality rationale: declined   Min Type Additional Details    [] Estim: []Att   []Unatt        []TENS instruct                  []IFC  []Premod   []NMES                     []Other:  []w/US   []w/ice   []w/heat  Position:  Location:    []  Traction: [] Cervical       []Lumbar                       [] Prone          []Supine                       []Intermittent   []Continuous Lbs:  [] before manual  [] after manual  []w/heat    []  Ultrasound: []Continuous   [] Pulsed at:                           []1MHz   []3MHz Location:  W/cm2:    [] Paraffin         Location:   []w/heat    []  Ice     []  Heat  []  Ice massage Position:SL  Location: L hip    []  Laser  []  Other: Position:  Location:      []  Vasopneumatic Device Pressure:       [] lo [] med [] hi   Temperature:      [x] Skin assessment post-treatment:  [x]intact []redness- no adverse reaction    []redness  adverse reaction:     50 min Therapeutic Exercise:  [x] See flow sheet :   Rationale: increase ROM and increase strength to improve the patients ability to sit, stand, ambulate, lift, carry, reach and complete ADL's    10 min Manual Therapy: MFR L piriformis, glut med;    Rationale: decrease pain, increase ROM, increase tissue extensibility and decrease trigger points to improve the patients ability to sit, stand, ambulate, lift, carry, reach and complete ADL's    With   [] TE   [] TA   [] neuro   [] other: Patient Education: [x] Review HEP    [] Progressed/Changed HEP based on:   [] positioning   [] body mechanics   [] transfers   [] heat/ice application    [] other:      Other Objective/Functional Measures: mod fatigue noted with hip hinges, Patient states she uses a pillow between her knees when sleeping, but it is very thin. Discussed using thicker pillow to allow more support for knees and better spine alinement with sleeping. Pt reports no knee pain with interventions and decreased knee pain by end of session. Pt limited with exercises due to knee pain, able to perform several after verbal cues for proper mechanics. FOTO completed     MMT L hip:    Abduction: 4/5   Extension: 4/5    Pain Level (0-10 scale) post treatment: 0     ASSESSMENT/Changes in Function:     Patient will continue to benefit from skilled PT services to modify and progress therapeutic interventions, address functional mobility deficits, address ROM deficits, address strength deficits, analyze and address soft tissue restrictions, analyze and cue movement patterns, analyze and modify body mechanics/ergonomics and assess and modify postural abnormalities to attain remaining goals. []  See Plan of Care  []  See progress note/recertification  []  See Discharge Summary         Progress towards goals / Updated goals:   Patient did well with all interventions with increased fatigue and muscle soreness noted by end of session. Patient has met all short term goals and will do well with continued focus on eccentric hamstring strength. Short Term Goals: To be accomplished in 8 treatments:  1. Patient will be able to sit for 10 minutes with <2/10 glut pain. Met  2. Patient will be able to walk two blocks with <2/10 glut pain. Met  3.  Patient will be able to perform a sit-to-stand transfer with <2/10 glut pain. Met  Long Term Goals: To be accomplished in 16 treatments: Progressing toward  1. Patient will be able to squat and  10# from the floor with no pain or limitation. 2. Patient will be able to sit for 30 minutes with no pain or limitation.   3. Patient will be able to sleep through the night without being woken up by glut pain  Frequency / Duration: Patient to be seen 2 times per week for 8 weeks.     PLAN  [x]  Upgrade activities as tolerated     [x]  Continue plan of care  []  Update interventions per flow sheet       []  Discharge due to:_  []  Other:_      Deyanira Gregory PTA  5/10/2018  11:40 AM

## 2018-05-10 NOTE — PROGRESS NOTES
[employer] [facility]  [place]  [address], [facility] [address], [address]  Phone: [phone]      Fax:  [phone]    Progress Note    Name: Renita Knight   : 1950   MD: Damion Chun MD       Treatment Diagnosis: Low back pain [M54.5]  Start of Care: 18    Visits from Start of Care: 10  Missed Visits: 0    Summary of Rebecca Santos has shown improved ROM, hip strength, and a decrease in hip pain when sitting for prolonged periods. She continues to have impairments in strength and has not met all long term goals, but should continue to see progress over the next several weeks towards eventual discharge to home exercise program only. Assessment / Recommendations:     Short Term Goals: To be accomplished in 8 treatments:  1. Patient will be able to sit for 10 minutes with <2/10 glut pain. Met  2. Patient will be able to walk two blocks with <2/10 glut pain. Met  3. Patient will be able to perform a sit-to-stand transfer with <2/10 glut pain. Met    The patient will continue to benefit from PT under the current plan of care to allow for the achievement of all long term goals. G-Codes (GP)  Mobility  [id number] Current  CK= 40-59%  [id number] Goal  CK= 40-59%    The severity rating is based on the FOTO Score score. Vaishali Bull, PT , DPT, OCS, Cert. DN   5/10/2018 5:52 PM    ________________________________________________________________________  NOTE TO PHYSICIAN:  Please complete the following and fax to: [facility]: Fax: [phone]. Rosendo Doyle Retain this original for your records. If you are unable to process this request in 24 hours, please contact our office.        ____ I have read the above report and request that my patient continue therapy with the following changes/special instructions:  ____ I have read the above report and request that my patient be discharged from therapy    Physician's Signature:_________________ Date:___________Time:__________

## 2018-05-15 ENCOUNTER — HOSPITAL ENCOUNTER (OUTPATIENT)
Dept: PHYSICAL THERAPY | Age: 68
Discharge: HOME | End: 2018-05-15
Payer: MEDICARE

## 2018-05-15 PROCEDURE — 97110 THERAPEUTIC EXERCISES: CPT | Performed by: PHYSICAL THERAPIST

## 2018-05-15 NOTE — PROGRESS NOTES
PT DAILY TREATMENT NOTE - [facility] 2-15    Patient Name: Carmel Kauffman  Date:5/15/2018  : 1950  [x]  Patient  Verified  Payor: [place] MEDICARE / Plan: [place] MEDICARE PART A & B / Product Type: Medicare /    In time:11:00 AM  Out time: 12:10 PM  Total Treatment Time (min): 70  Total Timed Codes (min): 55  1:1 Treatment Time ([facility] only): 30  Visit #: 11     Treatment Area: Low back pain [M54.5]    SUBJECTIVE  Pain Level (0-10 scale): 0  Any medication changes, allergies to medications, adverse drug reactions, diagnosis change, or new procedure performed?: [x] No    [] Yes (see summary sheet for update)  Subjective functional status/changes:   [] No changes reported  Patient reports that she is now using her pillow less frequently and has stopped using two pillows when sitting in her car. She has yet to travel since starting PT because she is apprehensive about an increase in hip pain. OBJECTIVE    Modality rationale:  To reduce pain to allow for improved ability to sit for prolonged periods without an increase in hip pain   Min Type Additional Details    [] Estim: []Att   []Unatt        []TENS instruct                  []IFC  []Premod   []NMES                     []Other:  []w/US   []w/ice   []w/heat  Position:  Location:    []  Traction: [] Cervical       []Lumbar                       [] Prone          []Supine                       []Intermittent   []Continuous Lbs:  [] before manual  [] after manual  []w/heat    []  Ultrasound: []Continuous   [] Pulsed at:                           []1MHz   []3MHz Location:  W/cm2:    [] Paraffin         Location:   []w/heat   10 [x]  Ice     []  Heat  []  Ice massage Position:SL  Location: L hip    []  Laser  []  Other: Position:  Location:      []  Vasopneumatic Device Pressure:       [] lo [] med [] hi   Temperature:      [x] Skin assessment post-treatment:  [x]intact []redness- no adverse reaction    []redness  adverse reaction:     45 min Therapeutic Exercise:  [x] See flow sheet :   Rationale: increase ROM and increase strength to improve the patients ability to sit, stand, ambulate, lift, carry, reach and complete ADL's    10 min Manual Therapy: MFR L piriformis, glut med;    Rationale: decrease pain, increase ROM, increase tissue extensibility and decrease trigger points to improve the patients ability to sit, stand, ambulate, lift, carry, reach and complete ADL's    With   [] TE   [] TA   [] neuro   [] other: Patient Education: [x] Review HEP    [] Progressed/Changed HEP based on:   [] positioning   [] body mechanics   [] transfers   [] heat/ice application    [] other:      Other Objective/Functional Measures:     Pain Level (0-10 scale) post treatment: 0     ASSESSMENT/Changes in Function:     Patient will continue to benefit from skilled PT services to modify and progress therapeutic interventions, address functional mobility deficits, address ROM deficits, address strength deficits, analyze and address soft tissue restrictions, analyze and cue movement patterns, analyze and modify body mechanics/ergonomics and assess and modify postural abnormalities to attain remaining goals. []  See Plan of Care  []  See progress note/recertification  []  See Discharge Summary         Progress towards goals / Updated goals:   Patient continues to make good progress with lateral hip strength and is now able to sit for longer periods with less reported hip pain. She will continue to benefit from a gradual progression of lateral hip strength to allow for the achievement of all long term goals.     PLAN  [x]  Upgrade activities as tolerated     [x]  Continue plan of care  []  Update interventions per flow sheet       []  Discharge due to:_  []  Other:_      Pamela Padilla, PT  , DPT, OCS, Cert.  DN   5/15/2018  11:40 AM

## 2018-05-17 ENCOUNTER — HOSPITAL ENCOUNTER (OUTPATIENT)
Dept: PHYSICAL THERAPY | Age: 68
Discharge: HOME | End: 2018-05-17
Payer: MEDICARE

## 2018-05-17 PROCEDURE — G8978 MOBILITY CURRENT STATUS: HCPCS | Performed by: PHYSICAL THERAPIST

## 2018-05-17 PROCEDURE — G8979 MOBILITY GOAL STATUS: HCPCS | Performed by: PHYSICAL THERAPIST

## 2018-05-17 PROCEDURE — 97110 THERAPEUTIC EXERCISES: CPT | Performed by: PHYSICAL THERAPIST

## 2018-05-17 NOTE — PROGRESS NOTES
PT DAILY TREATMENT NOTE - [facility] 2-15    Patient Name: Trent Sterling  Date:2018  : 1950  [x]  Patient  Verified  Payor: [place] MEDICARE / Plan: [place] MEDICARE PART A & B / Product Type: Medicare /    In time:11:00 AM  Out time: 12:10 PM  Total Treatment Time (min): 70  Total Timed Codes (min): 55  1:1 Treatment Time ( only): 30  Visit #: 12    Treatment Area: Low back pain [M54.5]    SUBJECTIVE  Pain Level (0-10 scale): 0  Any medication changes, allergies to medications, adverse drug reactions, diagnosis change, or new procedure performed?: [x] No    [] Yes (see summary sheet for update)  Subjective functional status/changes:   [] No changes reported  Patient reports that she does not have much pain today and feels ready for discharge. OBJECTIVE    Modality rationale:  To reduce pain to allow for improved ability to sit for prolonged periods without an increase in hip pain   Min Type Additional Details    [] Estim: []Att   []Unatt        []TENS instruct                  []IFC  []Premod   []NMES                     []Other:  []w/US   []w/ice   []w/heat  Position:  Location:    []  Traction: [] Cervical       []Lumbar                       [] Prone          []Supine                       []Intermittent   []Continuous Lbs:  [] before manual  [] after manual  []w/heat    []  Ultrasound: []Continuous   [] Pulsed at:                           []1MHz   []3MHz Location:  W/cm2:    [] Paraffin         Location:   []w/heat   10 [x]  Ice     []  Heat  []  Ice massage Position:SL  Location: L hip    []  Laser  []  Other: Position:  Location:      []  Vasopneumatic Device Pressure:       [] lo [] med [] hi   Temperature:      [x] Skin assessment post-treatment:  [x]intact []redness- no adverse reaction    []redness  adverse reaction:     45 min Therapeutic Exercise:  [x] See flow sheet :   Rationale: increase ROM and increase strength to improve the patients ability to sit, stand, ambulate, lift, carry, reach and complete ADL's    10 min Manual Therapy: MFR L piriformis, glut med;    Rationale: decrease pain, increase ROM, increase tissue extensibility and decrease trigger points to improve the patients ability to sit, stand, ambulate, lift, carry, reach and complete ADL's    With   [] TE   [] TA   [] neuro   [] other: Patient Education: [x] Review HEP    [] Progressed/Changed HEP based on:   [] positioning   [] body mechanics   [] transfers   [] heat/ice application    [] other:      Other Objective/Functional Measures:     Pain Level (0-10 scale) post treatment: 0     ASSESSMENT/Changes in Function:     Patient will continue to benefit from skilled PT services to modify and progress therapeutic interventions, address functional mobility deficits, address ROM deficits, address strength deficits, analyze and address soft tissue restrictions, analyze and cue movement patterns, analyze and modify body mechanics/ergonomics and assess and modify postural abnormalities to attain remaining goals. []  See Plan of Care  []  See progress note/recertification  [x]  See Discharge Summary           PLAN  [x]  Upgrade activities as tolerated     [x]  Continue plan of care  []  Update interventions per flow sheet       []  Discharge due to:_  []  Other:_      Bryson Varela, PT  , DPT, OCS, Cert.  DN   5/17/2018  11:40 AM

## 2018-06-21 NOTE — PROGRESS NOTES
[address] [facility], [place]  Phone: [phone] Fax: [phone]      Discharge Summary 2-15    Patient name: Dwayne Meier  : 1950  Provider#: [id number]  Referral source: Mathew Ibrahim MD      Medical/Treatment Diagnosis: Low back pain [M54.5]     Prior Hospitalization: see medical history     Comorbidities: See Plan of Care  Prior Level of Function: See Plan of Care  Medications: Verified on Patient Summary List    Start of Care: 18      Onset Date:2017   Visits from Start of Care: 12     Missed Visits: 0  Reporting Period : 18 to 18    Assessment/Summary of care: Patient did very well with PT with a significant improvement in lumbar and hip ROM, core stability, and a full resolution of left piriformis syndrome pain. She achieved all long term goals and no longer requires PT services. Short Term Goals: To be accomplished in 8 treatments:  1. Patient will be able to sit for 10 minutes with <2/10 glut pain. Met.  2. Patient will be able to walk two blocks with <2/10 glut pain. Met.  3. Patient will be able to perform a sit-to-stand transfer with <2/10 glut pain. Met. Long Term Goals: To be accomplished in 16 treatments:  1. Patient will be able to squat and  10# from the floor with no pain or limitation. Met.  2. Patient will be able to sit for 30 minutes with no pain or limitation. Met.  3. Patient will be able to sleep through the night without being woken up by glut pain. Met.    G-Codes (GP)  Mobility  [id number] Current  CK= 40-59%  [id number] Goal  CK= 40-59%    The severity rating is based on clinical judgment and the FOTO Score score. RECOMMENDATIONS:  [x]Discontinue therapy: [x]Patient has reached or is progressing toward set goals     []Patient is non-compliant or has abdicated     []Due to lack of appreciable progress towards set goals     []Other  Georganne Life, PT , DPT, OCS, Cert.  DN 6/21/2018 12:50 PM

## 2019-01-07 RX ORDER — LEVOTHYROXINE SODIUM 50 UG/1
TABLET ORAL
Qty: 30 TAB | Refills: 0 | Status: SHIPPED | OUTPATIENT
Start: 2019-01-07 | End: 2019-02-05 | Stop reason: SDUPTHER

## 2019-02-04 ENCOUNTER — HOSPITAL ENCOUNTER (OUTPATIENT)
Dept: PHYSICAL THERAPY | Age: 69
Discharge: HOME | End: 2019-02-04
Payer: MEDICARE

## 2019-02-04 PROCEDURE — 97162 PT EVAL MOD COMPLEX 30 MIN: CPT | Performed by: PHYSICAL THERAPIST

## 2019-02-04 PROCEDURE — 97110 THERAPEUTIC EXERCISES: CPT | Performed by: PHYSICAL THERAPIST

## 2019-02-04 NOTE — PROGRESS NOTES
PT INITIAL EVALUATION NOTE - [facility] 2-15 Patient Name: Anita Malave Date:2019 : 1950 [x]  Patient  Verified Payor: [place] MEDICARE / Plan: Bambi Dunbar y / Product Type: Medicare / In time:11:10 AM  Out time:12:00 PM 
Total Treatment Time (min): 50 Total Timed Codes (min): 40 
1:1 Treatment Time ( only): 50 Visit #: 1 Treatment Area: Left sided sciatica [M54.32] SUBJECTIVE Pain Level (0-10 scale): 4 Any medication changes, allergies to medications, adverse drug reactions, diagnosis change, or new procedure performed?: [] No    [x] Yes (see summary sheet for update) Subjective:   
Chronic left piriformis syndrome PLOF: No limitations with sitting, driving, bending forward Mechanism of Injury: Patient reports a recent flare-up of chronic left-sided glut pain in 2019. The pain limits her ability to sit for periods greater than 5 minutes and radiates into the lateral thigh. Previous Treatment/Compliance: Patient was treated at this location in April-2018 and did very well with PT. She was discharged with a HEP and reports that she stayed active with it for several months. She has not been regularly performing it recently and would like to discuss updating the program. 
PMHx/Surgical Hx: Lumbar spondylosis, scoliosis Work Hx: Retired Living Situation: lives in a one story home with  Pt Goals: to reduce pain and improve ability to sit Barriers: chronicity, severity Motivation: very motivated Substance use: none FABQ Score: elevated Cognition: A & O x 4 OBJECTIVE/EXAMINATION Gait and Functional Mobility: 
Gait: Mild antalgic gait pattern on left Squat: NT due to high pain Palpation: TTP along the left lateral piriformis Swelling:None Joint Mobility: NT 
 
PROM:    
   R  L Hip Flexion Hip IR   45  40 Hip ER   60  50 Hip Extension MMT:    R  L Hip flexors Hip adductors Hip abductors  4/5  4/5 Hip extensors  4/5  3+/5 All other LE myotomes: >4/5 Neurological: Sensation:intact Special Tests:      
 Chucky Diamond: NT      
 Jess Chang: Positive SLR: Negative DONTAE: Negative FAIR:Positive Scour:Negative Piriformis: Positive Modality rationale: Patient declined Min Type Additional Details  
 [] Estim: []Att   []Unatt        []TENS instruct []IFC  []Premod   []NMES []Other:  []w/US   []w/ice   []w/heat Position: Location:  
 []  Traction: [] Cervical       []Lumbar 
                     [] Prone          []Supine []Intermittent   []Continuous Lbs: 
[] before manual 
[] after manual 
[]w/heat  
 []  Ultrasound: []Continuous   [] Pulsed at: 
                         []1MHz   []3MHz Location: 
W/cm2:  
 [] Paraffin Location:  
[]w/heat  
 []  Ice     []  Heat 
[]  Ice massage Position: Location:  
 []  Laser 
[]  Other: Position: Location:  
 
 []  Vasopneumatic Device Pressure:       [] lo [] med [] hi  
Temperature:   
 
[x] Skin assessment post-treatment:  [x]intact []redness- no adverse reaction 
  []redness  adverse reaction:  
 
40 min Therapeutic Exercise:  [x] See flow sheet :  
Rationale: increase ROM, increase strength and improve coordination to improve the patients ability to sit for prolonged periods without pain With 
 [] TE 
 [] TA 
 [] neuro 
 [] other: Patient Education: [x] Review HEP [] Progressed/Changed HEP based on:  
[] positioning   [] body mechanics   [] transfers   [] heat/ice application   
[] other:   
 
Other Objective/Functional Measures: 
 
Pain Level (0-10 scale) post treatment: 0 
 
ASSESSMENT/Changes in Function:  
 
[x]  See Plan of Care Danitza Pollard, PT , DPT, OCS, Cert.  DN 
 2/4/2019  2:19 PM

## 2019-02-04 NOTE — PROGRESS NOTES
[facility] [address] [place] Phone: [phone]  Fax: [phone] Plan of Care/Statement of Necessity for Physical Therapy Services  2-15 Patient name: Khadijah Joe  : 1950  Provider#: [id number] Referral source: [place], [place] Medical/Treatment Diagnosis: Left sided sciatica [M54.32] Prior Hospitalization: see medical history Comorbidities: Scoliosis Prior Level of Function: see initial eval 
Medications: Verified on Patient Summary List 
Start of Care: 19     Onset Date: 2019 The Plan of Care and following information is based on the information from the initial evaluation. Assessment/ key information: Patient presents with chronic left piriformis syndrome with a recent flare-up one month ago. Today we spent most of the visit discussing her HEP and other home interventions and the patient will benefit from a short plan of care to establish independence with her updated program. 
 
Evaluation Complexity History MEDIUM  Complexity : 1-2 comorbidities / personal factors will impact the outcome/ POC ; Examination MEDIUM Complexity : 3 Standardized tests and measures addressing body structure, function, activity limitation and / or participation in recreation  ;Presentation MEDIUM Complexity : Evolving with changing characteristics  ; Clinical Decision Making MEDIUM Complexity : FOTO score of 26-74 Overall Complexity Rating: MEDIUM Problem List: pain affecting function, decrease ROM, decrease strength, decrease ADL/ functional abilitiies, decrease activity tolerance, decrease flexibility/ joint mobility and decrease transfer abilities Treatment Plan may include any combination of the following: Therapeutic exercise, Therapeutic activities, Neuromuscular re-education, Physical agent/modality, Gait/balance training, Manual therapy, Patient education, Self Care training, Functional mobility training and Home safety training Patient / Family readiness to learn indicated by: asking questions, trying to perform skills and interest 
Persons(s) to be included in education: patient (P) Barriers to Learning/Limitations: None Patient Goal (s): I want to be able to drive on long car rides without so much pain.  Patient Self Reported Health Status: excellent Rehabilitation Potential: excellent Short Term Goals: To be accomplished in 8 treatments: 1. Patient will be able to sit for 10 minutes with <2/10 glut pain. 2. Patient will be able to walk two blocks with <2/10 glut pain. 3. Patient will be able to perform a sit-to-stand transfer with <2/10 glut pain. Long Term Goals: To be accomplished in 16 treatments: 1. Patient will be able to squat and  10# from the floor with no pain or limitation. 2. Patient will be able to sit for 30 minutes with no pain or limitation. 3. Patient will be able to sleep through the night without being woken up by glut pain Frequency / Duration: Patient to be seen 2 times per week for 8 weeks. Patient/ Caregiver education and instruction: self care, activity modification and exercises 
 
[x]  Plan of care has been reviewed with PTA Certification Period: 2/4/19 - 5/4/19 Johnson Castillo, PT , DPT, OCS, Cert. DN 
 2/4/2019 2:14 PM 
 
________________________________________________________________________ I certify that the above Therapy Services are being furnished while the patient is under my care. I agree with the treatment plan and certify that this therapy is necessary. [de-identified] Signature:____________________  Date:____________Time: _________

## 2019-02-11 ENCOUNTER — HOSPITAL ENCOUNTER (OUTPATIENT)
Dept: PHYSICAL THERAPY | Age: 69
Discharge: HOME | End: 2019-02-11
Payer: MEDICARE

## 2019-02-11 PROCEDURE — 97112 NEUROMUSCULAR REEDUCATION: CPT

## 2019-02-11 PROCEDURE — 97110 THERAPEUTIC EXERCISES: CPT

## 2019-02-11 NOTE — PROGRESS NOTES
PT DAILY TREATMENT NOTE - [facility] 2-15 Patient Name: Cristy Vazqeuz Date:2019 : 1950 [x]  Patient  Verified Payor: [place] / Plan: Bambi Rollins / Product Type: Medicare / In time:3:00p  Out time:4:00p Total Treatment Time (min): 60 Total Timed Codes (min): 60 
1:1 Treatment Time ( [address] only): 60 Visit #: 2 Treatment Area: Left sided sciatica [M54.32] SUBJECTIVE Pain Level (0-10 scale): 3 Any medication changes, allergies to medications, adverse drug reactions, diagnosis change, or new procedure performed?: [x] No    [] Yes (see summary sheet for update) Subjective functional status/changes:   [] No changes reported Patient reports compliance with HEP. OBJECTIVE 45 min Therapeutic Exercise:  [x] See flow sheet :  
Rationale: increase ROM, increase strength, improve coordination, improve balance and increase proprioception to improve the patients ability to sit, stand, lift, carry, reach, ambulate, and complete ADL's 
 
15 min Neuromuscular Re-education:  [x]  See flow sheet :  
Rationale: increase ROM, increase strength, improve coordination, improve balance and increase proprioception  to improve the patients ability to sit, stand, lift, carry, reach, ambulate, and complete ADL's With 
 [] TE 
 [] TA 
 [] neuro 
 [] other: Patient Education: [x] Review HEP [] Progressed/Changed HEP based on:  
[] positioning   [] body mechanics   [] transfers   [] heat/ice application   
[] other:   
 
Other Objective/Functional Measures: no pain with advanced interventions Pain Level (0-10 scale) post treatment: 1 ASSESSMENT/Changes in Function:  
 
Patient will continue to benefit from skilled PT services to modify and progress therapeutic interventions, address functional mobility deficits, address ROM deficits, address strength deficits, analyze and address soft tissue restrictions, analyze and cue movement patterns, analyze and modify body mechanics/ergonomics and assess and modify postural abnormalities to attain remaining goals. []  See Plan of Care 
[]  See progress note/recertification 
[]  See Discharge Summary Progress towards goals / Updated goals: 
Patient demonstrates good tolerance for advanced interventions with no increased pain throughout and mod fatigue noted. Patient will do well with continued progression as tolerated to ensure good progress towards goals and home program. 
 
PLAN [x]  Upgrade activities as tolerated     [x]  Continue plan of care [x]  Update interventions per flow sheet      
[]  Discharge due to:_ 
[]  Other:_ Agueda Kumar 2/11/2019

## 2019-02-18 ENCOUNTER — HOSPITAL ENCOUNTER (OUTPATIENT)
Dept: PHYSICAL THERAPY | Age: 69
Discharge: HOME | End: 2019-02-18
Payer: MEDICARE

## 2019-02-18 PROCEDURE — 97110 THERAPEUTIC EXERCISES: CPT

## 2019-02-18 PROCEDURE — 97112 NEUROMUSCULAR REEDUCATION: CPT

## 2019-02-18 NOTE — PROGRESS NOTES
PT DAILY TREATMENT NOTE - [facility] 2-15 Patient Name: Lindsey Smith Date:2019 : 1950 [x]  Patient  Verified Payor: [place] MEDICARE / Plan: Bambi Dunbar [facility] / Product Type: Medicare / In time: 11:30a Out time: 12:30a Total Treatment Time (min): 60 Total Timed Codes (min): 60 
1:1 Treatment Time ([address] only): 60 Visit #: 3 Treatment Area: Left sided sciatica [M54.32] SUBJECTIVE Pain Level (0-10 scale): 1 Any medication changes, allergies to medications, adverse drug reactions, diagnosis change, or new procedure performed?: [x] No    [] Yes (see summary sheet for update) Subjective functional status/changes:   [] No changes reported Patient reports she feels like she is getting better and was able to sleep on her right side over the weekend. OBJECTIVE 45 min Therapeutic Exercise:  [x] See flow sheet :  
Rationale: increase ROM, increase strength, improve coordination, improve balance and increase proprioception to improve the patients ability to sit, stand, lift, carry, reach, ambulate, and complete ADL's 
 
15 min Neuromuscular Re-education:  [x]  See flow sheet :  
Rationale: increase ROM, increase strength, improve coordination, improve balance and increase proprioception  to improve the patients ability to sit, stand, lift, carry, reach, ambulate, and complete ADL's With 
 [] TE 
 [] TA 
 [] neuro 
 [] other: Patient Education: [x] Review HEP [] Progressed/Changed HEP based on:  
[] positioning   [] body mechanics   [] transfers   [] heat/ice application   
[] other:   
 
Other Objective/Functional Measures: no pain with advanced interventions, HEP updated Pain Level (0-10 scale) post treatment: 0 
 
ASSESSMENT/Changes in Function:  
 
Patient will continue to benefit from skilled PT services to modify and progress therapeutic interventions, address functional mobility deficits, address ROM deficits, address strength deficits, analyze and address soft tissue restrictions, analyze and cue movement patterns, analyze and modify body mechanics/ergonomics and assess and modify postural abnormalities to attain remaining goals. []  See Plan of Care 
[]  See progress note/recertification 
[]  See Discharge Summary Progress towards goals / Updated goals:  
Patient demonstrates good tolerance for advanced interventions with no increased pain throughout and mod fatigue noted. Patient will do well with continued progression as tolerated to ensure good progress towards goals and home program. Will go over goals and FOTO at next visit. PLAN [x]  Upgrade activities as tolerated     [x]  Continue plan of care [x]  Update interventions per flow sheet      
[]  Discharge due to:_ 
[]  Other:_ Royersford Apa 2/18/2019

## 2019-02-25 ENCOUNTER — HOSPITAL ENCOUNTER (OUTPATIENT)
Dept: PHYSICAL THERAPY | Age: 69
Discharge: HOME | End: 2019-02-25
Payer: MEDICARE

## 2019-02-25 PROCEDURE — 97112 NEUROMUSCULAR REEDUCATION: CPT | Performed by: PHYSICAL THERAPIST

## 2019-02-25 PROCEDURE — 97110 THERAPEUTIC EXERCISES: CPT | Performed by: PHYSICAL THERAPIST

## 2019-02-25 NOTE — PROGRESS NOTES
PT DAILY TREATMENT NOTE - [facility] 2-15 Patient Name: Cristy Vazquez Date:2019 : 1950 [x]  Patient  Verified Payor: [place] MEDICARE / Plan: Bambi Rollins / Product Type: Medicare / In time: 11:00a Out time: 12:00a Total Treatment Time (min): 60 Total Timed Codes (min): 60 
1:1 Treatment Time ([facility] only): 60 Visit #: 4 Treatment Area: Left sided sciatica [M54.32] SUBJECTIVE Pain Level (0-10 scale): 0 Any medication changes, allergies to medications, adverse drug reactions, diagnosis change, or new procedure performed?: [x] No    [] Yes (see summary sheet for update) Subjective functional status/changes:   [] No changes reported Patient reports no pain since her last visit and is very happy with her progress. OBJECTIVE 45 min Therapeutic Exercise:  [x] See flow sheet :  
Rationale: increase ROM, increase strength, improve coordination, improve balance and increase proprioception to improve the patients ability to sit, stand, lift, carry, reach, ambulate, and complete ADL's 
 
15 min Neuromuscular Re-education:  [x]  See flow sheet :  
Rationale: increase ROM, increase strength, improve coordination, improve balance and increase proprioception  to improve the patients ability to sit, stand, lift, carry, reach, ambulate, and complete ADL's With 
 [] TE 
 [] TA 
 [] neuro 
 [] other: Patient Education: [x] Review HEP [] Progressed/Changed HEP based on:  
[] positioning   [] body mechanics   [] transfers   [] heat/ice application   
[] other:   
 
Other Objective/Functional Measures: no pain with advanced interventions, HEP updated Pain Level (0-10 scale) post treatment: 0 
 
ASSESSMENT/Changes in Function:  
 
Patient will continue to benefit from skilled PT services to modify and progress therapeutic interventions, address functional mobility deficits, address ROM deficits, address strength deficits, analyze and address soft tissue restrictions, analyze and cue movement patterns, analyze and modify body mechanics/ergonomics and assess and modify postural abnormalities to attain remaining goals. []  See Plan of Care 
[]  See progress note/recertification 
[x]  See Discharge Summary PLAN [x]  Upgrade activities as tolerated     [x]  Continue plan of care [x]  Update interventions per flow sheet      
[]  Discharge due to:_ 
[]  Other:_   
 
Darrel Mcqueen, PT , DPT, OCS, Cert. DN 
 2/25/2019

## 2019-03-06 ENCOUNTER — HOSPITAL ENCOUNTER (OUTPATIENT)
Dept: PHYSICAL THERAPY | Age: 69
Discharge: HOME | End: 2019-03-06
Payer: MEDICARE

## 2019-03-06 PROCEDURE — 97110 THERAPEUTIC EXERCISES: CPT | Performed by: PHYSICAL THERAPIST

## 2019-03-06 NOTE — PROGRESS NOTES
PT DAILY TREATMENT NOTE - [facility] 2-15    Patient Name: Cleve Conner  Date:3/6/2019  : 1950  [x]  Patient  Verified  Payor: [place] MEDICARE / Plan: [place] MEDICARE PART A & B / Product Type: Medicare /    In time: 1:20 PM Out time: 2:00 PM  Total Treatment Time (min): 40  Total Timed Codes (min): 40  1:1 Treatment Time ( only): 40   Visit #: 5    Treatment Area: Left sided sciatica [M54.32]    SUBJECTIVE  Pain Level (0-10 scale): 0  Any medication changes, allergies to medications, adverse drug reactions, diagnosis change, or new procedure performed?: [x] No    [] Yes (see summary sheet for update)  Subjective functional status/changes:   [] No changes reported  Patient reports an increase in R sided piriformis syndrome and is unsure about the cause. She has an ortho visit with Dr. Deshawn Brewer scheduled soon to discuss treatment options. She has returned to the PT clinic to discuss her current HEP and to see if there is a cause to increase in R sided pain.     OBJECTIVE    40 min Therapeutic Exercise:  [x] See flow sheet :   Rationale: increase ROM, increase strength, improve coordination, improve balance and increase proprioception to improve the patients ability to sit, stand, lift, carry, reach, ambulate, and complete ADL's    With   [] TE   [] TA   [] neuro   [] other: Patient Education: [x] Review HEP    [] Progressed/Changed HEP based on:   [] positioning   [] body mechanics   [] transfers   [] heat/ice application    [] other:      Other Objective/Functional Measures: no pain with advanced interventions, HEP updated     Pain Level (0-10 scale) post treatment: 0    ASSESSMENT/Changes in Function:     Patient will continue to benefit from skilled PT services to modify and progress therapeutic interventions, address functional mobility deficits, address ROM deficits, address strength deficits, analyze and address soft tissue restrictions, analyze and cue movement patterns, analyze and modify body mechanics/ergonomics and assess and modify postural abnormalities to attain remaining goals. []  See Plan of Care  []  See progress note/recertification  []  See Discharge Summary        Progress towards goals: Patient tolerated today's ther ex without an increase in R sided piriformis pain and will wait until she consults the orthopedist to continue PT. PLAN  [x]  Upgrade activities as tolerated     [x]  Continue plan of care  [x]  Update interventions per flow sheet       []  Discharge due to:_  []  Other:_      Tom Márquez, PT , DPT, OCS, Cert.  DN   3/6/2019

## 2019-03-27 ENCOUNTER — TELEPHONE (OUTPATIENT)
Dept: INTERNAL MEDICINE CLINIC | Age: 69
End: 2019-03-27

## 2019-03-27 NOTE — TELEPHONE ENCOUNTER
Patient has CPE scheduled with Yuliin on 3/29/19. She is a [facility] patient. No notes on Ravussin continuing care. Thanks.

## 2019-04-09 RX ORDER — LEVOTHYROXINE SODIUM 50 UG/1
TABLET ORAL
Qty: 90 TAB | Refills: 0 | Status: SHIPPED | OUTPATIENT
Start: 2019-04-09

## 2019-04-22 NOTE — PROGRESS NOTES
[facility] Physical Therapy [address], [address] Phone: [phone] Fax: [phone] Discharge Summary 2-15 Patient name: Selvin Flowers  : 1950  Provider#: [id number] Referral source: Kim Chowdhury MD     
Medical/Treatment Diagnosis: Left sided sciatica [M54.32] Prior Hospitalization: see medical history Comorbidities: See Plan of Care Prior Level of Function: See Plan of Care Medications: Verified on Patient Summary List 
 
Start of Care: 19      Onset Date:2019 Visits from Start of Care: 5     Missed Visits: 0 Reporting Period : 19 to 3/6/19 Assessment/Summary of care: Patient did very well with PT with a focus on improving independence in the management of her symptoms with an advanced HEP. She achieved all long term goals and has not needed to return to the clinic over the past month. 
 
  
Short Term Goals: To be accomplished in 8 treatments: 1. Patient will be able to sit for 10 minutes with <2/10 glut pain. Met. 
2. Patient will be able to walk two blocks with <2/10 glut pain. Met. 
3. Patient will be able to perform a sit-to-stand transfer with <2/10 glut pain. Met. 
Long Term Goals: To be accomplished in 16 treatments: 1. Patient will be able to squat and  10# from the floor with no pain or limitation. Met. 
2. Patient will be able to sit for 30 minutes with no pain or limitation. Met. 
3. Patient will be able to sleep through the night without being woken up by glut painMet. RECOMMENDATIONS: 
[x]Discontinue therapy: [x]Patient has reached or is progressing toward set goals []Patient is non-compliant or has abdicated 
   []Due to lack of appreciable progress towards set goals []Other Danitza Atul, PT , DPT, OCS, Cert. DN 
 2019

## 2021-04-12 ENCOUNTER — TRANSCRIBE ORDER (OUTPATIENT)
Dept: SCHEDULING | Age: 71
End: 2021-04-12

## 2021-04-12 DIAGNOSIS — R94.4 NONSPECIFIC ABNORMAL RESULTS OF KIDNEY FUNCTION STUDY: Primary | ICD-10-CM

## 2021-05-07 ENCOUNTER — HOSPITAL ENCOUNTER (OUTPATIENT)
Dept: PHYSICAL THERAPY | Age: 71
Discharge: HOME | End: 2021-05-07
Payer: MEDICARE

## 2021-05-07 PROCEDURE — 97110 THERAPEUTIC EXERCISES: CPT

## 2021-05-07 PROCEDURE — 97162 PT EVAL MOD COMPLEX 30 MIN: CPT

## 2021-05-07 NOTE — PROGRESS NOTES
Physical Therapy at [facility],   a part of  [facility]  [place]  [facility] Polly Gibson  Phone: [phone]  Fax: [phone]    Plan of Care/Statement of Necessity for Physical Therapy Services  2-15    Patient name: Laila Yao  : 1950  Provider#: [id number]  Referral source: Mayo Wallace MD      Medical/Treatment Diagnosis: Left knee pain [M25.562]  Left elbow pain [M25.522]     Prior Hospitalization: see medical history     Comorbidities: hypothyroid, OA, sciatica, hypercholesterolemia   Prior Level of Function: See initial evaluation  Medications: Verified on Patient Summary List  Start of Care: 21      Onset Date: 21   The Plan of Care and following information is based on the information from the initial evaluation. Assessment/ key information: Patient is a 65year old female presenting with L knee pain and L elbow pain, symptoms consistent with lateral epicondylitis. Current symptoms in the knee limit functional ability to ambulate longer than 5 minutes, stand longer than 1 minute, ascend/descend stairs, cooking meals or washing dishes, or retrieve the mail. Symptoms in the L elbow limit her functional ability to hold a glass of water, curl her hair, vacuum, or wash dishes. Marked deficits include tenderness to palpation of the pes anserine area and distal triceps insertion, pain with figure 4 position, ITB tightness, LE flexibility and ROM deficits, and elbow pain with overpressure supination and pronation. Comorbidities and/or treatment precautions include hypothyroid, OA, sciatica, hypercholesterolemia . Patient will benefit from skilled PT to address all below listed deficits.         Evaluation Complexity History HIGH Complexity :3+ comorbidities / personal factors will impact the outcome/ POC ; Examination MEDIUM Complexity : 3 Standardized tests and measures addressing body structure, function, activity limitation and / or participation in recreation  ;Presentation LOW Complexity : Stable, uncomplicated  ;Clinical Decision Making MEDIUM Complexity : FOTO score of 26-74  Overall Complexity Rating: MEDIUM    Problem List: pain affecting function, decrease ROM, decrease strength, impaired gait/ balance, decrease ADL/ functional abilitiies, decrease activity tolerance, decrease flexibility/ joint mobility and decrease transfer abilities   Treatment Plan may include any combination of the following: Therapeutic exercise, Therapeutic activities, Neuromuscular re-education, Physical agent/modality, Gait/balance training, Manual therapy, Patient education, Self Care training, Functional mobility training, Home safety training and Stair training  Patient / Family readiness to learn indicated by: asking questions, trying to perform skills and interest  Persons(s) to be included in education: patient (P)  Barriers to Learning/Limitations: None  Patient Goal (s): No pain  Patient Self Reported Health Status: good  Rehabilitation Potential: excellent    Short Term Goals: To be accomplished in 4 weeks:  1. Patient will be independent with initial HEP in order to transition to general wellness program.  2. Patient will demonstrate appropriate knee flexion in ambulation with <3/10 pain to progress return to community ambulation  3. Patient will progress AROM elbow exercises to 1# resistance to progress return to curling her hair  Long Term Goals: To be accomplished in 12 weeks:  1. Patient will demonstrate normal gait mechanics for >500 ft with <2/10 pain to return to community ambulation  2. Patient will be able to vacuum her house without modification to return to premorbid status in caring for her home. 3. Patient will be able to ascend/descend stairs with reciprocal pattern and 1 UE assistance with <2/10 pain to increase independence in the community.    Frequency / Duration: Patient to be seen 2 times per week for 12 weeks. Patient/ Caregiver education and instruction: self care, activity modification, brace/ splint application and exercises    [x]  Plan of care has been reviewed with PTA        Certification Period: 5/7/21-8/7/21  Naya Humphrey DPT 5/7/2021     ________________________________________________________________________    I certify that the above Therapy Services are being furnished while the patient is under my care. I agree with the treatment plan and certify that this therapy is necessary.     Physician's Signature:____________________  Date:____________Time: _________         Shiela Real MD

## 2021-05-07 NOTE — PROGRESS NOTES
PT INITIAL EVALUATION NOTE - [facility] 2-15    Patient Name: Shagufta Claudio  Date:2021  : 1950  [x]  Patient  Verified  Payor: [place] MEDICARE / Plan: VA MEDICARE PART A & B / Product Type: Medicare /    In time: 10:00 a  Out time: 6 a  Total Treatment Time (min): 60  Total Timed Codes (min): 10  1:1 Treatment Time ( only): 10   Visit #: 1     Treatment Area: Left knee pain [M25.562]  Left elbow pain [M25.522]    SUBJECTIVE  Pain Level (0-10 scale): best- 1, current- 1, worst- 5  Any medication changes, allergies to medications, adverse drug reactions, diagnosis change, or new procedure performed?: [] No    [x] Yes (see summary sheet for update)  Subjective:    Chief complaint: L knee pain when walking, L elbow pain when lifting and squeezing  Aggravating factors: Knee: Walking, sometimes in the morning after prolonged knee extension overnight, hypersensitive to light touch behind knee; Elbow: squeezing with L hand, using L hand generally (R handed)    Easing factors: ice, Tylenol, Volterin, strap eases elbow pain, rest    Imaging/tests:  Xray knee- minimal OA otherwise unremarkable  Numbness/tingling: Denies    Current level of function:Able to walk around the store if leaning on cart, needs to be cautious stepping up curb, able to walk 5 minutes without pain, unable to complete home maintenance activities including vacuuming, standing to cook a meal or washing dishes, curling her hair, walking up and down the driveway, able to stand for 1 minute at a time, can't hold a glass of water or use L hand to vacuum, difficulty on stairs with step to pattern    PLOF: Independent and pain free with all ADL's   Mechanism of Injury: L knee insidious onset 5 weeks ago, symptoms have changed from sharp stinging to dull aching; L elbow insidious onset 5 weeks ago, possible 2/2 holding phone in bed at night in supine  Previous Treatment/Compliance: MD ordered x-ray of knee and prescribed exercises and lateral epicondylitis strap  PMHx/Surgical Hx: hypothyroid, OA, sciatica, hypercholesterolemia   Work Hx: Retired, enjoys watching her grandchildren  Living Situation: [address], lives with     Pt Goals: No pain  Barriers: None  Motivation: Motivated and completed own research into exercises for elbow pain, initiated with pain reduction   Substance use: None  Cognition: A & O x 4        ** She researched exercises for Tennis Elbow after MD diagnosis and has initiated them with pain reduction: Wrist flexion/extension stretches, AROM extension, radial and ulnar deviation, supination and pronation, towel wringing, finger spreading against resistance, massage to common extensor tendon and icing  ** Exercises were reviewed with PT and clarified for form, encouraged to continue      OBJECTIVE/EXAMINATION  Other Observations:  Head shaking tremor, benign per chart   Gait: Avoids L knee flexion, \"peg-leg\" limp, cautious and antalgic   Palpation: mild TTP pes anserine   Swelling: None noted  Joint Mobility:    Patellar: Normal pain free     Lower Extremity AROM:        R  L  Hip IR   WNL  Limited  Hip ER   WNL  Limited  Knee Flexion  140  120, p! Knee Extension 0  0, p! Upper Extremity AROM:         R  L  Shoulder    Grossly WNL B           Elbow Flexion   WNL  WNL   Elbow Extension  WNL  WNL  Elbow Supination   WNL  WNL, p! With overpressure  Elbow Pronation  WNL  WNL, most p! With overpressure     Wrist flexion   WNL  WNL  Wrist extension  WNL  WNL  Wrist UD   WNL  WNL  Wrist RD   WNL  WNL     LE MMT     R  L  Hip flexion   4  4  Knee extension  5  5  Knee flexion   4+  4  Ankle DF  5  5  Ankle PF  5  5  Hip extension   3+  3+    UE MMT:      R  L  Wrist flexion   5  5  Wrist extension  5  5  Wrist UD   4  4-  Wrist RD   4  4-        LE Flexibility (restriction)     R  L  Hamstrings  Mod  mod  Quadriceps  Mod   Mod   Gastrocnemius  Mod  mod  Figure 4    Mod, p!          Sensation: Intact and equal bilaterally      LE Special Tests:     DONTAE: L positive   Larissa: L positive    Sciatic nerve tension: negative   Anterior drawer: negative   Posterior drawer: negative   Varus/Valgus stress: negative      10 min Therapeutic Exercise:  [x] See flow sheet :   Rationale: increase ROM and increase strength to improve the patients ability to ambulate without pain           With   [x] TE   [] TA   [] Neuro   [] SC   [] other: Patient Education: [x] Review HEP    [] Progressed/Changed HEP based on:   [x] positioning   [] body mechanics   [] transfers   [x] heat/ice application    [] other:      Other Objective/Functional Measures: FOTO Functional Measure: 41/100           Pain Level (0-10 scale) post treatment: 1    ASSESSMENT/Changes in Function:     [x]  See Plan of April Beyer 25, DPT 5/7/2021

## 2021-05-10 ENCOUNTER — APPOINTMENT (OUTPATIENT)
Dept: PHYSICAL THERAPY | Age: 71
End: 2021-05-10

## 2021-05-13 ENCOUNTER — HOSPITAL ENCOUNTER (OUTPATIENT)
Dept: PHYSICAL THERAPY | Age: 71
Discharge: HOME | End: 2021-05-13
Payer: MEDICARE

## 2021-05-13 PROCEDURE — 97112 NEUROMUSCULAR REEDUCATION: CPT

## 2021-05-13 PROCEDURE — 97110 THERAPEUTIC EXERCISES: CPT

## 2021-05-13 PROCEDURE — 97014 ELECTRIC STIMULATION THERAPY: CPT

## 2021-05-13 NOTE — PROGRESS NOTES
PT DAILY TREATMENT NOTE - [facility] -15    Patient Name: Bill Rosa  Date:2021  : 1950  [x]  Patient  Verified  Payor: Javi Lopez / Plan: [place] MED[place] & B / Product Type: Medicare /    In time:10:50a  Out time:11:50a  Total Treatment Time (min): 60  Total Timed Codes (min): 45  1:1 Treatment Time ( [address] only): 40   Visit #:  2    Treatment Area: Left knee pain [M25.562]  Left elbow pain [M25.522]    SUBJECTIVE  Pain Level (0-10 scale): 1  Any medication changes, allergies to medications, adverse drug reactions, diagnosis change, or new procedure performed?: [x] No    [] Yes (see summary sheet for update)  Subjective functional status/changes:   [] No changes reported  Patient reports she felt better after last visit. Patient reports her knee felt bad last night and into this morning, but was able to get relief with the exercises.     OBJECTIVE    Modality rationale: decrease pain and increase tissue extensibility to improve the patients ability to sit, stand, lift, carry, reach, ambulate and complete    Min Type Additional Details    15 [x] Estim: []Att   [x]Unatt    []TENS instruct                  []IFC  [x]Premod   []NMES                     []Other:  []w/US   []w/ice   [x]w/heat  Position: supine  Location: knee, elbow       []  Traction: [] Cervical       []Lumbar                       [] Prone          []Supine                       []Intermittent   []Continuous Lbs:  [] before manual  [] after manual  []w/heat    []  Ultrasound: []Continuous   [] Pulsed                       at: []1MHz   []3MHz Location:  W/cm2:    [] Paraffin         Location:   []w/heat    []  Ice     []  Heat  []  Ice massage Position:  Location:    []  Laser  []  Other: Position:  Location:      []  Vasopneumatic Device Pressure:       [] lo [] med [] hi   Temperature:      [x] Skin assessment post-treatment:  [x]intact []redness- no adverse reaction    []redness  adverse reaction:     45 min Therapeutic Exercise:  [x] See flow sheet :   Rationale: increase ROM, increase strength, improve coordination, improve balance and increase proprioception to improve the patients ability to sit, stand, lift, carry, reach ambulate and complete ADL's       With   [] TE   [] TA   [] Neuro   [] SC   [] other: Patient Education: [x] Review HEP    [x] Progressed/Changed HEP based on:   [] positioning   [x] body mechanics   [] transfers   [] heat/ice application    [x] other: reviewed heel/toe progression with ambulation, allowing knee to bend, and even weightshift with standing and knees not locked      Other Objective/Functional Measures:  Good tolerance for exercises with decreased tightness and tenderness after several exercises     Pain Level (0-10 scale) post treatment: \"little sore\"    ASSESSMENT/Changes in Function:     Patient will continue to benefit from skilled PT services to modify and progress therapeutic interventions, address functional mobility deficits, address ROM deficits, address strength deficits, analyze and address soft tissue restrictions, analyze and cue movement patterns, analyze and modify body mechanics/ergonomics and assess and modify postural abnormalities to attain remaining goals. []  See Plan of Care  []  See progress note/recertification  []  See Discharge Summary         Progress towards goals / Updated goals:  Patient making good initial progress towards goals and will do well with continued progression as tolerated. Short Term Goals: To be accomplished in 4 weeks:  1. Patient will be independent with initial HEP in order to transition to general wellness program.  2. Patient will demonstrate appropriate knee flexion in ambulation with <3/10 pain to progress return to community ambulation  3. Patient will progress AROM elbow exercises to 1# resistance to progress return to curling her hair  Long Term Goals: To be accomplished in 12 weeks:  1.  Patient will demonstrate normal gait mechanics for >500 ft with <2/10 pain to return to community ambulation  2. Patient will be able to vacuum her house without modification to return to premorbid status in caring for her home. 3. Patient will be able to ascend/descend stairs with reciprocal pattern and 1 UE assistance with <2/10 pain to increase independence in the community. Frequency / Duration: Patient to be seen 2 times per week for 12 weeks.     PLAN  [x]  Upgrade activities as tolerated     [x]  Continue plan of care  [x]  Update interventions per flow sheet       []  Discharge due to:_  []  Other:_      Deneen Weir, PTA 5/13/2021

## 2021-05-17 ENCOUNTER — HOSPITAL ENCOUNTER (OUTPATIENT)
Dept: PHYSICAL THERAPY | Age: 71
Discharge: HOME | End: 2021-05-17
Payer: MEDICARE

## 2021-05-17 PROCEDURE — 97110 THERAPEUTIC EXERCISES: CPT

## 2021-05-17 PROCEDURE — 97014 ELECTRIC STIMULATION THERAPY: CPT

## 2021-05-17 NOTE — PROGRESS NOTES
PT DAILY TREATMENT NOTE - [facility] 2-15    Patient Name: Ashlyn Rajput  Date:2021  : 1950  [x]  Patient  Verified  Payor: [place] MEDICARE / Plan: [place] MEDICARE PART A & B / Product Type: Medicare /    In time:9:30 a  Out time:10:25 a  Total Treatment Time (min): 55  Total Timed Codes (min): 45  1:1 Treatment Time ( [address] only): 39   Visit #:  3    Treatment Area: Left knee pain [M25.562]  Left elbow pain [M25.522]    SUBJECTIVE  Pain Level (0-10 scale): 1  Any medication changes, allergies to medications, adverse drug reactions, diagnosis change, or new procedure performed?: [x] No    [] Yes (see summary sheet for update)  Subjective functional status/changes:   [] No changes reported  Patient reports that her knee feels about the same today. She had a list of questions regarding which exercises to do at home, which she can do more than once a day, and how far she should be walking daily- PT advised on all questions.      OBJECTIVE    Modality rationale: decrease pain and increase tissue extensibility to improve the patients ability to sit, stand, lift, carry, reach, ambulate and complete    Min Type Additional Details    10 [x] Estim: []Att   [x]Unatt    []TENS instruct                  [x]IFC  []Premod   []NMES                     []Other:  []w/US   []w/ice   [x]w/heat  Position: supine  Location: knee       []  Traction: [] Cervical       []Lumbar                       [] Prone          []Supine                       []Intermittent   []Continuous Lbs:  [] before manual  [] after manual  []w/heat    []  Ultrasound: []Continuous   [] Pulsed                       at: []1MHz   []3MHz Location:  W/cm2:    [] Paraffin         Location:   []w/heat    []  Ice     []  Heat  []  Ice massage Position:  Location:    []  Laser  []  Other: Position:  Location:      []  Vasopneumatic Device Pressure:       [] lo [] med [] hi   Temperature:      [x] Skin assessment post-treatment:  [x]intact [x]redness- no adverse reaction []redness  adverse reaction:     45 min Therapeutic Exercise:  [x] See flow sheet :   Rationale: increase ROM, increase strength, improve coordination, improve balance and increase proprioception to improve the patients ability to sit, stand, lift, carry, reach ambulate and complete ADL's       With   [] TE   [] TA   [] Neuro   [] SC   [] other: Patient Education: [x] Review HEP    [x] Progressed/Changed HEP based on:   [] positioning   [x] body mechanics   [] transfers   [] heat/ice application    [x] other: reviewed HEP and progression of repetitions of exercises      Other Objective/Functional Measures:      Pain Level (0-10 scale) post treatment: 0.5/10     ASSESSMENT/Changes in Function:   Bike warm up with positive response and decreased tightness, did not aggravate sciatica. Pain onset in knee with single leg stance during stretching exercise. Patient requested to d/c e-stim early 2/2 discomfort, abolished when electrodes were removed. Patient will continue to benefit from skilled PT services to modify and progress therapeutic interventions, address functional mobility deficits, address ROM deficits, address strength deficits, analyze and address soft tissue restrictions, analyze and cue movement patterns, analyze and modify body mechanics/ergonomics and assess and modify postural abnormalities to attain remaining goals. []  See Plan of Care  []  See progress note/recertification  []  See Discharge Summary         Progress towards goals / Updated goals:  Patient making good initial progress towards goals and will do well with continued progression as tolerated. Short Term Goals: To be accomplished in 4 weeks:  1. Patient will be independent with initial HEP in order to transition to general wellness program. MET  2. Patient will demonstrate appropriate knee flexion in ambulation with <3/10 pain to progress return to community ambulation MET  3.  Patient will progress AROM elbow exercises to 1# resistance to progress return to curling her hair. No change  Long Term Goals: To be accomplished in 12 weeks:  1. Patient will demonstrate normal gait mechanics for >500 ft with <2/10 pain to return to community ambulation. Progressing toward   2. Patient will be able to vacuum her house without modification to return to premorbid status in caring for her home. No change  3. Patient will be able to ascend/descend stairs with reciprocal pattern and 1 UE assistance with <2/10 pain to increase independence in the community. progressing toward   Frequency / Duration: Patient to be seen 2 times per week for 12 weeks.     PLAN  [x]  Upgrade activities as tolerated     [x]  Continue plan of care  [x]  Update interventions per flow sheet       []  Discharge due to:_  []  Other:_      Prabhakar Bowles DPT 5/17/2021

## 2021-05-20 ENCOUNTER — HOSPITAL ENCOUNTER (OUTPATIENT)
Dept: PHYSICAL THERAPY | Age: 71
Discharge: HOME | End: 2021-05-20
Payer: MEDICARE

## 2021-05-20 PROCEDURE — 97110 THERAPEUTIC EXERCISES: CPT

## 2021-05-20 NOTE — PROGRESS NOTES
PT DAILY TREATMENT NOTE - [facility] 2-15    Patient Name: Carla Prado  Date:2021  : 1950  [x]  Patient  Verified  Payor: [place] MEDICARE / Plan: [place] MEDICARE PART A & B / Product Type: Medicare /    In time:11:30a  Out time:12:10p  Total Treatment Time (min): 40  Total Timed Codes (min): 40  1:1 Treatment Time ( [address] only): 40   Visit #:  4    Treatment Area: Left knee pain [M25.562]  Left elbow pain [M25.522]    SUBJECTIVE  Pain Level (0-10 scale): 1  Any medication changes, allergies to medications, adverse drug reactions, diagnosis change, or new procedure performed?: [x] No    [] Yes (see summary sheet for update)  Subjective functional status/changes:   [] No changes reported  Patient reports she feels a little discomfort but has not done her stretches that she usually does in the morning. Patient states she walked up an incline recently and feels more tight through the back of the knee today. Patient states her hip has been 'acting up' and would like to hold the bike as she feels it might make it worse. OBJECTIVE      40 min Therapeutic Exercise:  [x] See flow sheet :   Rationale: increase ROM, increase strength, improve coordination, improve balance and increase proprioception to improve the patients ability to sit, stand, lift, carry, reach ambulate and complete ADL's       With   [] TE   [] TA   [] Neuro   [] SC   [] other: Patient Education: [x] Review HEP    [x] Progressed/Changed HEP based on:   [] positioning   [x] body mechanics   [] transfers   [] heat/ice application    [x] other: reviewed HEP and progression of repetitions of exercises      Other Objective/Functional Measures:      Pain Level (0-10 scale) post treatment: 0/10     ASSESSMENT/Changes in Function:   Pt with pain relief after stretches and is able to tolerate SLS with min strategies.      Patient will continue to benefit from skilled PT services to modify and progress therapeutic interventions, address functional mobility deficits, address ROM deficits, address strength deficits, analyze and address soft tissue restrictions, analyze and cue movement patterns, analyze and modify body mechanics/ergonomics and assess and modify postural abnormalities to attain remaining goals. []  See Plan of Care  []  See progress note/recertification  []  See Discharge Summary         Progress towards goals / Updated goals:  Patient making good progress towards goals and will do well with continued progression of standing interventions as tolerated. Short Term Goals: To be accomplished in 4 weeks:  1. Patient will be independent with initial HEP in order to transition to general wellness program. MET  2. Patient will demonstrate appropriate knee flexion in ambulation with <3/10 pain to progress return to community ambulation MET  3. Patient will progress AROM elbow exercises to 1# resistance to progress return to curling her hair. No change  Long Term Goals: To be accomplished in 12 weeks:  1. Patient will demonstrate normal gait mechanics for >500 ft with <2/10 pain to return to community ambulation. Progressing toward   2. Patient will be able to vacuum her house without modification to return to premorbid status in caring for her home. No change  3. Patient will be able to ascend/descend stairs with reciprocal pattern and 1 UE assistance with <2/10 pain to increase independence in the community. progressing toward   Frequency / Duration: Patient to be seen 2 times per week for 12 weeks.     PLAN  [x]  Upgrade activities as tolerated     [x]  Continue plan of care  [x]  Update interventions per flow sheet       []  Discharge due to:_  []  Other:_      Tod Him, PTA 5/20/2021

## 2021-05-25 ENCOUNTER — HOSPITAL ENCOUNTER (OUTPATIENT)
Dept: PHYSICAL THERAPY | Age: 71
Discharge: HOME | End: 2021-05-25
Payer: MEDICARE

## 2021-05-25 PROCEDURE — 97110 THERAPEUTIC EXERCISES: CPT | Performed by: PHYSICAL MEDICINE & REHABILITATION

## 2021-05-25 PROCEDURE — 97140 MANUAL THERAPY 1/> REGIONS: CPT | Performed by: PHYSICAL MEDICINE & REHABILITATION

## 2021-05-25 PROCEDURE — 97112 NEUROMUSCULAR REEDUCATION: CPT | Performed by: PHYSICAL MEDICINE & REHABILITATION

## 2021-05-25 PROCEDURE — 97016 VASOPNEUMATIC DEVICE THERAPY: CPT | Performed by: PHYSICAL MEDICINE & REHABILITATION

## 2021-05-25 NOTE — PROGRESS NOTES
PT DAILY TREATMENT NOTE - [facility] 2-15    Patient Name: Zurdo Lind  Date:2021  : 1950  [x]  Patient  Verified  Payor: [place] MEDICARE / Plan: [place] MEDICARE PART A & B / Product Type: Medicare /    In time:1100  Out time:1200  Total Treatment Time (min): 60  Total Timed Codes (min): 45  1:1 Treatment Time ( [address] only): 45   Visit #: 5      Treatment Area: Left knee pain [M25.562]  Left elbow pain [M25.522]    SUBJECTIVE  Pain Level (0-10 scale): 0/10  Any medication changes, allergies to medications, adverse drug reactions, diagnosis change, or new procedure performed?: [x] No    [] Yes (see summary sheet for update)  Subjective functional status/changes:   [] No changes reported  Pt reports having pain in her L elbow that woke her up last night but she feels no pain today. Pt also notes that her leg and elbow are usually only painful toward the end of the day. OBJECTIVE    Modality rationale: decrease inflammation and decrease pain to improve the patients ability to ADLs, sitting, standing and walking tolerance.    Min Type Additional Details    [] Estim: []Att   []Unatt        []TENS instruct                  []IFC  []Premod   []NMES                     []Other:  []w/US   []w/ice   []w/heat  Position:  Location:    []  Traction: [] Cervical       []Lumbar                       [] Prone          []Supine                       []Intermittent   []Continuous Lbs:  [] before manual  [] after manual  []w/heat    []  Ultrasound: []Continuous   [] Pulsed at:                           []1MHz   []3MHz Location:  W/cm2:    [] Paraffin         Location:   []w/heat    []  Ice     []  Heat  []  Ice massage Position:  Location:    []  Laser  []  Other: Position:  Location:   15   [x]  Vasopneumatic Device Pressure:       [x] lo [] med [] hi   Temperature: 34     [x] Skin assessment post-treatment:  [x]intact []redness- no adverse reaction    []redness  adverse reaction:     30 min Therapeutic Exercise:  [x] See flow sheet :   Rationale: increase ROM, increase strength and improve coordination to improve the patients ability to ADLs, sitting, standing and walking tolerance. 15 min Manual Therapy:  L knee Posterior/Anterior mobs, Soft tissue massage posterior/medial L knee and L elbow   Rationale: decrease pain, increase ROM and increase tissue extensibility to improve the patients ability to ADLs, sitting, standing and walking tolerance. With   [x] TE   [] TA   [] neuro   [] other: Patient Education: [x] Review HEP    [] Progressed/Changed HEP based on:   [] positioning   [] body mechanics   [] transfers   [] heat/ice application    [] other:      Other Objective/Functional Measures: Pt tolerated all treatments without complaints of pain or discomfort. Pt declined recumbent bike saying it will probably cause hip pain. Pain Level (0-10 scale) post treatment: 0/10    ASSESSMENT/Changes in Function:     Patient will continue to benefit from skilled PT services to modify and progress therapeutic interventions, address functional mobility deficits, address ROM deficits, address strength deficits, analyze and address soft tissue restrictions, analyze and cue movement patterns, analyze and modify body mechanics/ergonomics and assess and modify postural abnormalities to attain remaining goals. []  See Plan of Care  []  See progress note/recertification  []  See Discharge Summary         Progress towards goals / Updated goals:  Pt has shown steady progress toward STG with independent HEP and appropriate L knee ROM during gait without any pain.     PLAN  [x]  Upgrade activities as tolerated     [x]  Continue plan of care  [x]  Update interventions per flow sheet       []  Discharge due to:_  []  Other:_      Tree Rausch, SPT 5/25/2021     VC 1  TE 2  MT 1

## 2021-05-27 ENCOUNTER — HOSPITAL ENCOUNTER (OUTPATIENT)
Dept: PHYSICAL THERAPY | Age: 71
Discharge: HOME | End: 2021-05-27
Payer: MEDICARE

## 2021-05-27 PROCEDURE — 97110 THERAPEUTIC EXERCISES: CPT

## 2021-05-27 PROCEDURE — 97112 NEUROMUSCULAR REEDUCATION: CPT

## 2021-05-27 NOTE — PROGRESS NOTES
PT DAILY TREATMENT NOTE - [facility] 2-15    Patient Name: Laila Yao  Date:2021  : 1950  [x]  Patient  Verified  Payor: Cherry Irizarry / Plan: [place] MEDICARE PART A & B / Product Type: Medicare /    In time:11:30a Out time: 12:15p  Total Treatment Time (min): 45  Total Timed Codes (min): 45  1:1 Treatment Time ( [address] only): 45   Visit #: 6      Treatment Area: Left knee pain [M25.562]  Left elbow pain [M25.522]    SUBJECTIVE  Pain Level (0-10 scale): 0/10  Any medication changes, allergies to medications, adverse drug reactions, diagnosis change, or new procedure performed?: [x] No    [] Yes (see summary sheet for update)  Subjective functional status/changes:   [] No changes reported  Patient reports she feels she is getting a little better, but continues to get pain more towards the end of the day or standing for long periods of time.      OBJECTIVE    Modality rationale: declined   Min Type Additional Details    [] Estim: []Att   []Unatt        []TENS instruct                  []IFC  []Premod   []NMES                     []Other:  []w/US   []w/ice   []w/heat  Position:  Location:    []  Traction: [] Cervical       []Lumbar                       [] Prone          []Supine                       []Intermittent   []Continuous Lbs:  [] before manual  [] after manual  []w/heat    []  Ultrasound: []Continuous   [] Pulsed at:                           []1MHz   []3MHz Location:  W/cm2:    [] Paraffin         Location:   []w/heat    []  Ice     []  Heat  []  Ice massage Position:  Location:    []  Laser  []  Other: Position:  Location:      [x]  Vasopneumatic Device Pressure:       [x] lo [] med [] hi   Temperature: 34     [x] Skin assessment post-treatment:  [x]intact []redness- no adverse reaction    []redness  adverse reaction:     45 min Therapeutic Exercise:  [x] See flow sheet :   Rationale: increase ROM, increase strength and improve coordination to improve the patients ability to ADLs, sitting, standing and walking tolerance. With   [x] TE   [] TA   [] neuro   [] other: Patient Education: [x] Review HEP    [x] Progressed/Changed HEP based on:   [x] positioning   [x] body mechanics   [] transfers   [x] heat/ice application    [x] other: extensively reviewed exercises for home, frequency, how to progress, when to regress, ice application     Other Objective/Functional Measures: good tolerance for all standing interventions, min pulling with glut squeeze with PPT in supine, no increased back tension/pain reported,     Pt to hold bridges secondary to increased back pain and tension by end of day     Pain Level (0-10 scale) post treatment: 0/10    ASSESSMENT/Changes in Function:     Patient will continue to benefit from skilled PT services to modify and progress therapeutic interventions, address functional mobility deficits, address ROM deficits, address strength deficits, analyze and address soft tissue restrictions, analyze and cue movement patterns, analyze and modify body mechanics/ergonomics and assess and modify postural abnormalities to attain remaining goals. []  See Plan of Care  []  See progress note/recertification  []  See Discharge Summary         Progress towards goals / Updated goals:  Patient making good overall progress towards goals with improved overall strength and improved mechanics with interventions and ambulation. Patient will do well with continued progression as tolerated.      PLAN  [x]  Upgrade activities as tolerated     [x]  Continue plan of care  [x]  Update interventions per flow sheet       []  Discharge due to:_  []  Other:_      Akila Norris, PTA 5/27/2021

## 2021-06-01 ENCOUNTER — HOSPITAL ENCOUNTER (OUTPATIENT)
Dept: PHYSICAL THERAPY | Age: 71
Discharge: HOME | End: 2021-06-01
Payer: MEDICARE

## 2021-06-01 PROCEDURE — 97110 THERAPEUTIC EXERCISES: CPT | Performed by: PHYSICAL MEDICINE & REHABILITATION

## 2021-06-01 PROCEDURE — 97016 VASOPNEUMATIC DEVICE THERAPY: CPT | Performed by: PHYSICAL MEDICINE & REHABILITATION

## 2021-06-01 NOTE — PROGRESS NOTES
PT DAILY TREATMENT NOTE - [facility] 2-15    Patient Name: Katrina Erickson  Date:2021  : 1950  [x]  Patient  Verified  Payor: [place] MEDICARE / Plan: [place] MEDICARE PART A & B / Product Type: Medicare /    In time:1015  Out time:1110  Total Treatment Time (min): 55  Total Timed Codes (min): 45  1:1 Treatment Time ( [address] only): 45   Visit #: 7      Treatment Area: Left knee pain [M25.562]  Left elbow pain [M25.522]    SUBJECTIVE  Pain Level (0-10 scale): 0/10  Any medication changes, allergies to medications, adverse drug reactions, diagnosis change, or new procedure performed?: [x] No    [] Yes (see summary sheet for update)  Subjective functional status/changes:   [] No changes reported  Pt has been feeling good the past couple of days with some tightness in L knee after walking around a lot then sitting down for hours. Pt noted the game ready was too cold last visit. OBJECTIVE    Modality rationale: decrease edema, decrease inflammation and decrease pain to improve the patients ability to ADLs, sitting, standing and walking tolerance.    Min Type Additional Details    [] Estim: []Att   []Unatt        []TENS instruct                  []IFC  []Premod   []NMES                     []Other:  []w/US   []w/ice   []w/heat  Position:  Location:    []  Traction: [] Cervical       []Lumbar                       [] Prone          []Supine                       []Intermittent   []Continuous Lbs:  [] before manual  [] after manual  []w/heat    []  Ultrasound: []Continuous   [] Pulsed at:                           []1MHz   []3MHz Location:  W/cm2:    [] Paraffin         Location:   []w/heat    []  Ice     []  Heat  []  Ice massage Position:  Location:    []  Laser  []  Other: Position:  Location:   10   [x]  Vasopneumatic Device       Location: L knee Pressure:       [x] lo [] med [] hi   Temperature: 40     [x] Skin assessment post-treatment:  [x]intact []redness- no adverse reaction    []redness  adverse reaction:     45 min Therapeutic Exercise:  [x] See flow sheet :   Rationale: increase ROM, increase strength and improve coordination to improve the patients ability to ADLs, sitting, standing and walking tolerance. With   [x] TE   [] TA   [] neuro   [] other: Patient Education: [x] Review HEP    [] Progressed/Changed HEP based on:   [] positioning   [] body mechanics   [] transfers   [] heat/ice application    [] other:      Other Objective/Functional Measures: Pt educated on LLE long-sitting positioning. Min pain during squats. Pt tolerated vaso with min discomfort. Pain Level (0-10 scale) post treatment: 0/10    ASSESSMENT/Changes in Function:     Patient will continue to benefit from skilled PT services to modify and progress therapeutic interventions, address functional mobility deficits, address ROM deficits, address strength deficits, analyze and address soft tissue restrictions, analyze and cue movement patterns, analyze and modify body mechanics/ergonomics and assess and modify postural abnormalities to attain remaining goals. []  See Plan of Care  []  See progress note/recertification  []  See Discharge Summary         Progress towards goals / Updated goals:  Pt shows steady progress toward STG and LTG with more consistent 0/10 pain in LLE do assist with appropriate knee flexion during ambulation.     PLAN  [x]  Upgrade activities as tolerated     [x]  Continue plan of care  [x]  Update interventions per flow sheet       []  Discharge due to:_  []  Other:_      HERBIE Figueroa 6/1/2021     VC 1  TE 3

## 2021-06-03 ENCOUNTER — HOSPITAL ENCOUNTER (OUTPATIENT)
Dept: PHYSICAL THERAPY | Age: 71
Discharge: HOME | End: 2021-06-03
Payer: MEDICARE

## 2021-06-03 PROCEDURE — 97110 THERAPEUTIC EXERCISES: CPT

## 2021-06-03 NOTE — PROGRESS NOTES
PT DAILY TREATMENT NOTE - [facility] 2-15    Patient Name: Sreedhar Perez  Date:6/3/2021  : 1950  [x]  Patient  Verified  Payor: [place] MEDICARE / Plan: [place] MEDICARE PART A & B / Product Type: Medicare /    In time:10:45a Out time:11:35a  Total Treatment Time (min): 50  Total Timed Codes (min): 45  1:1 Treatment Time ( [address] only): 39   Visit #: 8      Treatment Area: Left knee pain [M25.562]  Left elbow pain [M25.522]    SUBJECTIVE  Pain Level (0-10 scale): 0.5-1/10  Any medication changes, allergies to medications, adverse drug reactions, diagnosis change, or new procedure performed?: [x] No    [] Yes (see summary sheet for update)  Subjective functional status/changes:   [] No changes reported  Patient reports she continues to have increased sensitivity back of the knee and down into gastroc. Patient reports the ice from last visit continues to irritate the back of the knee from the pressure. OBJECTIVE      50 min Therapeutic Exercise:  [x] See flow sheet :   Rationale: increase ROM, increase strength and improve coordination to improve the patients ability to ADLs, sitting, standing and walking tolerance. With   [x] TE   [] TA   [] neuro   [] other: Patient Education: [x] Review HEP    [] Progressed/Changed HEP based on:   [] positioning   [] body mechanics   [] transfers   [] heat/ice application    [x] other: reviewed shoe list and proper footwear to avoid wearing shoes with heel and that push her towards her toes. Pt to trial walking/standing around house without shoes until she can get prper footwear to see if this will allow for gastroc pain relief. Other Objective/Functional Measures: discomfort with vaso after last visit, will discontinue further use    Anterior knee pain with mini squats, switched to sit to stand with no pain and improved form, appropriate muscle activation reported with modification.     Pain Level (0-10 scale) post treatment: 0/10    ASSESSMENT/Changes in Function: Patient will continue to benefit from skilled PT services to modify and progress therapeutic interventions, address functional mobility deficits, address ROM deficits, address strength deficits, analyze and address soft tissue restrictions, analyze and cue movement patterns, analyze and modify body mechanics/ergonomics and assess and modify postural abnormalities to attain remaining goals. []  See Plan of Care  []  See progress note/recertification  []  See Discharge Summary         Progress towards goals / Updated goals:  Patient did well with modified exercises with no increased pain with cues and modifications.      PLAN  [x]  Upgrade activities as tolerated     [x]  Continue plan of care  [x]  Update interventions per flow sheet       []  Discharge due to:_  []  Other:_      Anabell Gorman, PTA 6/3/2021

## 2021-06-07 ENCOUNTER — HOSPITAL ENCOUNTER (OUTPATIENT)
Dept: PHYSICAL THERAPY | Age: 71
Discharge: HOME | End: 2021-06-07
Payer: MEDICARE

## 2021-06-07 PROCEDURE — 97110 THERAPEUTIC EXERCISES: CPT

## 2021-06-07 PROCEDURE — 97140 MANUAL THERAPY 1/> REGIONS: CPT

## 2021-06-07 NOTE — PROGRESS NOTES
Physical Therapy at [facility],   a part of  [place] [facility]  [address] [facility], [address]  Phone: [phone]      Fax:  [phone]    Progress Note    Name: Jon Anthony   : 1950   MD: Shiela Real MD       Treatment Diagnosis: Left knee pain [M25.562]  Left elbow pain [M25.522]  Start of Care: 21    Visits from Start of Care: 9  Missed Visits: 1    Assessment / Recommendations: At time of reassessment, patient has made excellent progress in knee pain and notes general abolition of medial knee pain, overall noting worst current pain as 2/10. Remaining pain is noted as \"tightness\" of posterior genu and anterior knee pain when maneuvering on stairs and incline surfaces. Per patient request to focus on knee, elbow pain has not yet been addressed in clinic, although patient notes reduction in pain with independent HEP exercises from initial evaluation. Plan to continue progress of knee interventions and introduce elbow exercises at next follow up session. Patient will benefit from continued PT 2x/week to address remaining knee and elbow pain. 1. Patient will be independent with initial HEP in order to transition to general wellness program. MET  2. Patient will demonstrate appropriate knee flexion in ambulation with <3/10 pain to progress return to community ambulation. MET  3. Patient will progress AROM elbow exercises to 1# resistance to progress return to curling her hair. Not yet addressed 2/2 initial focus on knee     1. Patient will demonstrate normal gait mechanics for >500 ft with <2/10 pain to return to community ambulation MET  2. Patient will be able to vacuum her house without modification to return to premorbid status in caring for her home. Progressing toward- remains cautious    3.  Patient will be able to ascend/descend stairs with reciprocal pattern and 1 UE assistance with <2/10 pain to increase independence in the community.  Progressing toward- inconsistency of symptoms       Divina Mc, SIDDHARTH 6/7/2021

## 2021-06-07 NOTE — PROGRESS NOTES
PT DAILY TREATMENT NOTE - [facility] 2-15    Patient Name: Charisma Doyle  Date:2021  : 1950  [x]  Patient  Verified  Payor: [place] MEDICARE / Plan: VA MEDICARE PART A & B / Product Type: Medicare /    In time:10:30 a Out time:11:30 a  Total Treatment Time (min): 60  Total Timed Codes (min): 60  1:1 Treatment Time ( [address] only): 60   Visit #: 9      Treatment Area: Left knee pain [M25.562]  Left elbow pain [M25.522]    SUBJECTIVE  Pain Level (0-10 scale): 2- pain in the knee   Any medication changes, allergies to medications, adverse drug reactions, diagnosis change, or new procedure performed?: [x] No    [] Yes (see summary sheet for update)  Subjective functional status/changes:   [] No changes reported  Patient reports that she walked up a steep incline and has had anterior knee pain. She notes overall improvement in the knee pain that brought her to therapy but new pains with ascending stairs and incline surfaces. OBJECTIVE      50 min Therapeutic Exercise:  [x] See flow sheet :   Rationale: increase ROM, increase strength and improve coordination to improve the patients ability to ADLs, sitting, standing and walking tolerance. 10 min Manual Therapy: Popliteal fossa, medial proximal gastroc, distal hamstring      Rationale: decrease pain, increase ROM and increase tissue extensibility to improve the patients ability to sleep at night           With   [x] TE   [] TA   [] neuro   [] other: Patient Education: [x] Review HEP    [] Progressed/Changed HEP based on:   [] positioning   [] body mechanics   [] transfers   [] heat/ice application    [x] other: HEP consolidation and review      Other Objective/Functional Measures: Normal gait  Knee flexio still 120 and 140   Negative DONTAE   Hip flexion- 4+  Knee flexion 5 B  HS and gastroc mod      Pain Level (0-10 scale) post treatment: 0/10     ASSESSMENT/Changes in Function:   Patient with discomfort during manual intervention but pain relief following.  Plan to re-address elbow pain next session as symptoms have changed. Patient will continue to benefit from skilled PT services to modify and progress therapeutic interventions, address functional mobility deficits, address ROM deficits, address strength deficits, analyze and address soft tissue restrictions, analyze and cue movement patterns, analyze and modify body mechanics/ergonomics and assess and modify postural abnormalities to attain remaining goals. []  See Plan of Care  [x]  See progress note/recertification  []  See Discharge Summary         Progress towards goals / Updated goals:  Short Term Goals: To be accomplished in 4 weeks:  1. Patient will be independent with initial HEP in order to transition to general wellness program. MET  2. Patient will demonstrate appropriate knee flexion in ambulation with <3/10 pain to progress return to community ambulation. MET  3. Patient will progress AROM elbow exercises to 1# resistance to progress return to curling her hair. Not attempted 2/2 initial focus on knee   Long Term Goals: To be accomplished in 12 weeks:  1. Patient will demonstrate normal gait mechanics for >500 ft with <2/10 pain to return to community ambulation MET  2. Patient will be able to vacuum her house without modification to return to premorbid status in caring for her home. Progressing toward- remains careful   3. Patient will be able to ascend/descend stairs with reciprocal pattern and 1 UE assistance with <2/10 pain to increase independence in the community.  Progressing toward- inconsistent function     PLAN  [x]  Upgrade activities as tolerated     [x]  Continue plan of care  [x]  Update interventions per flow sheet       []  Discharge due to:_  []  Other:_      Nickie Luis DPT 6/7/2021

## 2021-06-10 ENCOUNTER — HOSPITAL ENCOUNTER (OUTPATIENT)
Dept: PHYSICAL THERAPY | Age: 71
Discharge: HOME | End: 2021-06-10
Payer: MEDICARE

## 2021-06-10 PROCEDURE — 97110 THERAPEUTIC EXERCISES: CPT

## 2021-06-10 PROCEDURE — 97140 MANUAL THERAPY 1/> REGIONS: CPT

## 2021-06-10 NOTE — PROGRESS NOTES
PT DAILY TREATMENT NOTE - [facility] 2-15    Patient Name: Shamika Brewster  Date:6/10/2021  : 1950  [x]  Patient  Verified  Payor: VA MEDICARE / Plan: VA MEDICARE PART A & B / Product Type: Medicare /    In time:11:00 a Out time: 12:00 p  Total Treatment Time (min): 60  Total Timed Codes (min): 60  1:1 Treatment Time ( [address] only): 60   Visit #: 10      Treatment Area: Left knee pain [M25.562]  Left elbow pain [M25.522]    SUBJECTIVE  Pain Level (0-10 scale): 1  Any medication changes, allergies to medications, adverse drug reactions, diagnosis change, or new procedure performed?: [x] No    [] Yes (see summary sheet for update)  Subjective functional status/changes:   [] No changes reported  Patient reports that following manual intervention last session she was able to sleep without pain behind her knee for the first time in weeks. OBJECTIVE      45 min Therapeutic Exercise:  [x] See flow sheet :   Rationale: increase ROM, increase strength and improve coordination to improve the patients ability to ADLs, sitting, standing and walking tolerance. 15 min Manual Therapy: Popliteal fossa, medial proximal gastroc, distal hamstring,    Rationale: decrease pain, increase ROM and increase tissue extensibility to improve the patients ability to sleep at night           With   [x] TE   [] TA   [] neuro   [] other: Patient Education: [x] Review HEP    [] Progressed/Changed HEP based on:   [] positioning   [] body mechanics   [] transfers   [] heat/ice application    [x] other: HEP consolidation and review      Other Objective/Functional Measures:  Pain with full elbow extension and resisted pronation, weakness with supination. Pain Level (0-10 scale) post treatment: 0    ASSESSMENT/Changes in Function:   Elbow pain no longer appears to be of lateral epicondylitis origin, patient encouraged to wean from brace and instructed in new elbow HEP per noted remaining deficits.  Plan to continue focus of therapy on addressing knee pain, elbow secondarily. Noted positive response to manual intervention. Patient will continue to benefit from skilled PT services to modify and progress therapeutic interventions, address functional mobility deficits, address ROM deficits, address strength deficits, analyze and address soft tissue restrictions, analyze and cue movement patterns, analyze and modify body mechanics/ergonomics and assess and modify postural abnormalities to attain remaining goals. []  See Plan of Care  [x]  See progress note/recertification  []  See Discharge Summary         Progress towards goals / Updated goals:  Short Term Goals: To be accomplished in 4 weeks:  1. Patient will be independent with initial HEP in order to transition to general wellness program. MET  2. Patient will demonstrate appropriate knee flexion in ambulation with <3/10 pain to progress return to community ambulation. MET  3. Patient will progress AROM elbow exercises to 1# resistance to progress return to curling her hair. Not attempted 2/2 initial focus on knee   Long Term Goals: To be accomplished in 12 weeks:  1. Patient will demonstrate normal gait mechanics for >500 ft with <2/10 pain to return to community ambulation MET  2. Patient will be able to vacuum her house without modification to return to premorbid status in caring for her home. Progressing toward- remains careful   3. Patient will be able to ascend/descend stairs with reciprocal pattern and 1 UE assistance with <2/10 pain to increase independence in the community.  Progressing toward- inconsistent function     PLAN  [x]  Upgrade activities as tolerated     [x]  Continue plan of care  [x]  Update interventions per flow sheet       []  Discharge due to:_  []  Other:_      Kyle Montejo DPT 6/10/2021

## 2021-06-14 ENCOUNTER — HOSPITAL ENCOUNTER (OUTPATIENT)
Dept: PHYSICAL THERAPY | Age: 71
Discharge: HOME | End: 2021-06-14
Payer: MEDICARE

## 2021-06-14 PROCEDURE — 97110 THERAPEUTIC EXERCISES: CPT

## 2021-06-14 PROCEDURE — 97140 MANUAL THERAPY 1/> REGIONS: CPT

## 2021-06-14 NOTE — PROGRESS NOTES
PT DAILY TREATMENT NOTE - [facility] 2-15    Patient Name: Bev Kincaid  Date:2021  : 1950  [x]  Patient  Verified  Payor: [place] MEDICARE / Plan: VA MEDICARE PART A & B / Product Type: Medicare /    In time:10:30 a Out time: 11:25 p  Total Treatment Time (min): 55  Total Timed Codes (min): 55  1:1 Treatment Time ( [address] only): 55   Visit #: 11      Treatment Area: Left knee pain [M25.562]  Left elbow pain [M25.522]    SUBJECTIVE  Pain Level (0-10 scale): 1  Any medication changes, allergies to medications, adverse drug reactions, diagnosis change, or new procedure performed?: [x] No    [] Yes (see summary sheet for update)  Subjective functional status/changes:   [] No changes reported  Patient notes continued sensitivity in her posterior knee but minimal improvement. She has decreased wearing her arm brace and notes slight increased pain in the elbow yesterday. OBJECTIVE      40 min Therapeutic Exercise:  [x] See flow sheet :   Rationale: increase ROM, increase strength and improve coordination to improve the patients ability to ADLs, sitting, standing and walking tolerance. 15 min Manual Therapy: Popliteal fossa, medial proximal gastroc, distal hamstring    Rationale: decrease pain, increase ROM and increase tissue extensibility to improve the patients ability to sleep at night           With   [x] TE   [] TA   [] neuro   [] other: Patient Education: [x] Review HEP    [] Progressed/Changed HEP based on:   [] positioning   [] body mechanics   [] transfers   [] heat/ice application    [x] other: HEP consolidation and review      Other Objective/Functional Measures:      Pain Level (0-10 scale) post treatment: 0.5- twinge in knee when standing     ASSESSMENT/Changes in Function:   Patient notes no pain in anterior or medial knee, only popliteal fossa hypersensitivity. Focus of session on elbow exercises. Ache noted with supination/pronation and eccentric wrist extension, no reproduction of \"stinging\" pain. Pain with bicep curl, no pain with elbow extension. Patient will continue to benefit from skilled PT services to modify and progress therapeutic interventions, address functional mobility deficits, address ROM deficits, address strength deficits, analyze and address soft tissue restrictions, analyze and cue movement patterns, analyze and modify body mechanics/ergonomics and assess and modify postural abnormalities to attain remaining goals. []  See Plan of Care  [x]  See progress note/recertification  []  See Discharge Summary         Progress towards goals / Updated goals:  Short Term Goals: To be accomplished in 4 weeks:  1. Patient will be independent with initial HEP in order to transition to general wellness program. MET  2. Patient will demonstrate appropriate knee flexion in ambulation with <3/10 pain to progress return to community ambulation. MET  3. Patient will progress AROM elbow exercises to 1# resistance to progress return to curling her hair. Not attempted 2/2 initial focus on knee   Long Term Goals: To be accomplished in 12 weeks:  1. Patient will demonstrate normal gait mechanics for >500 ft with <2/10 pain to return to community ambulation MET  2. Patient will be able to vacuum her house without modification to return to premorbid status in caring for her home. Progressing toward- remains careful   3. Patient will be able to ascend/descend stairs with reciprocal pattern and 1 UE assistance with <2/10 pain to increase independence in the community.  Progressing toward- inconsistent function     PLAN  [x]  Upgrade activities as tolerated     [x]  Continue plan of care  [x]  Update interventions per flow sheet       []  Discharge due to:_  []  Other:_      Dipti Claudio DPT 6/14/2021

## 2021-06-16 ENCOUNTER — HOSPITAL ENCOUNTER (OUTPATIENT)
Dept: PHYSICAL THERAPY | Age: 71
Discharge: HOME | End: 2021-06-16
Payer: MEDICARE

## 2021-06-16 PROCEDURE — 97140 MANUAL THERAPY 1/> REGIONS: CPT

## 2021-06-16 PROCEDURE — 97110 THERAPEUTIC EXERCISES: CPT

## 2021-06-16 NOTE — PROGRESS NOTES
PT DAILY TREATMENT NOTE - [facility] 2-15    Patient Name: Samira Ray  Date:2021  : 1950  [x]  Patient  Verified  Payor: Gunnar Byers / Plan: VA MEDICARE PART A & B / Product Type: Medicare /    In time:10:00a Out time: 10:55a  Total Treatment Time (min): 55  Total Timed Codes (min): 55  1:1 Treatment Time ([facility] only): 54   Visit #: 12      Treatment Area: Left knee pain [M25.562]  Left elbow pain [M25.522]    SUBJECTIVE  Pain Level (0-10 scale): 1  Any medication changes, allergies to medications, adverse drug reactions, diagnosis change, or new procedure performed?: [x] No    [] Yes (see summary sheet for update)  Subjective functional status/changes:   [] No changes reported  Patient reports she is feeling better after the manual, but it did hurt at the time. Patient states she is better able to straighten her knee when going to bed compared to when she started. Patient reports she has started wearing the brace less, but does put it on when she is doing more activities or when she is starting to have more pain (usually by end of day). OBJECTIVE      40 min Therapeutic Exercise:  [x] See flow sheet :   Rationale: increase ROM, increase strength and improve coordination to improve the patients ability to ADLs, sitting, standing and walking tolerance.     15 min Manual Therapy: Popliteal fossa, medial proximal gastroc, distal hamstring    Rationale: decrease pain, increase ROM and increase tissue extensibility to improve the patients ability to sleep at night           With   [x] TE   [] TA   [] neuro   [] other: Patient Education: [x] Review HEP    [] Progressed/Changed HEP based on:   [] positioning   [] body mechanics   [] transfers   [] heat/ice application    [x] other: updated to include stretches and triceps strengthening     Other Objective/Functional Measures:      Pain Level (0-10 scale) post treatment: 0.5- twinge in knee when standing     ASSESSMENT/Changes in Function:   Continued pain with full resistive SROM bicep curls, no pain with curls to 90 deg elbow flexion. .        Patient will continue to benefit from skilled PT services to modify and progress therapeutic interventions, address functional mobility deficits, address ROM deficits, address strength deficits, analyze and address soft tissue restrictions, analyze and cue movement patterns, analyze and modify body mechanics/ergonomics and assess and modify postural abnormalities to attain remaining goals. []  See Plan of Care  [x]  See progress note/recertification  []  See Discharge Summary         Progress towards goals / Updated goals:  Short Term Goals: To be accomplished in 4 weeks:  1. Patient will be independent with initial HEP in order to transition to general wellness program. MET  2. Patient will demonstrate appropriate knee flexion in ambulation with <3/10 pain to progress return to community ambulation. MET  3. Patient will progress AROM elbow exercises to 1# resistance to progress return to curling her hair. Not attempted 2/2 initial focus on knee   Long Term Goals: To be accomplished in 12 weeks:  1. Patient will demonstrate normal gait mechanics for >500 ft with <2/10 pain to return to community ambulation MET  2. Patient will be able to vacuum her house without modification to return to premorbid status in caring for her home. Progressing toward- remains careful   3. Patient will be able to ascend/descend stairs with reciprocal pattern and 1 UE assistance with <2/10 pain to increase independence in the community.  Progressing toward- inconsistent function     PLAN  [x]  Upgrade activities as tolerated     [x]  Continue plan of care  [x]  Update interventions per flow sheet       []  Discharge due to:_  []  Other:_      Isabelle Kulkarni 6/16/2021

## 2021-06-22 ENCOUNTER — HOSPITAL ENCOUNTER (OUTPATIENT)
Dept: PHYSICAL THERAPY | Age: 71
Discharge: HOME | End: 2021-06-22
Payer: MEDICARE

## 2021-06-22 PROCEDURE — 97140 MANUAL THERAPY 1/> REGIONS: CPT

## 2021-06-22 PROCEDURE — 97110 THERAPEUTIC EXERCISES: CPT

## 2021-06-22 NOTE — PROGRESS NOTES
PT DAILY TREATMENT NOTE - [facility] 2-15    Patient Name: Angélica Pickens  Date:2021  : 1950  [x]  Patient  Verified  Payor: Tano López / Plan: VA MEDICARE PART A & B / Product Type: Medicare /    In time:2:50p Out time: 3:45p  Total Treatment Time (min): 55  Total Timed Codes (min): 55  1:1 Treatment Time ( [address] only): 55   Visit #: 13      Treatment Area: Left knee pain [M25.562]  Left elbow pain [M25.522]    SUBJECTIVE  Pain Level (0-10 scale): 0  Any medication changes, allergies to medications, adverse drug reactions, diagnosis change, or new procedure performed?: [x] No    [] Yes (see summary sheet for update)  Subjective functional status/changes:   [] No changes reported  Patient reports she continues to get occasional pain with certain activities with her elbow. Patient reports she only has pain behind her knee when sleeping at night. Patient states she started using ice again on the back of her knee with a pillow support and felt better last night. OBJECTIVE      40 min Therapeutic Exercise:  [x] See flow sheet :   Rationale: increase ROM, increase strength and improve coordination to improve the patients ability to ADLs, sitting, standing and walking tolerance.     15 min Manual Therapy: Popliteal fossa, medial proximal gastroc, distal hamstring    Rationale: decrease pain, increase ROM and increase tissue extensibility to improve the patients ability to sleep at night           With   [x] TE   [] TA   [] neuro   [] other: Patient Education: [x] Review HEP    [] Progressed/Changed HEP based on:   [] positioning   [] body mechanics   [] transfers   [] heat/ice application    [] other:      Other Objective/Functional Measures:      Pain Level (0-10 scale) post treatment: 0    ASSESSMENT/Changes in Function:   No pain with bicep curls, good tolerance for advanced intervenitons       Patient will continue to benefit from skilled PT services to modify and progress therapeutic interventions, address functional mobility deficits, address ROM deficits, address strength deficits, analyze and address soft tissue restrictions, analyze and cue movement patterns, analyze and modify body mechanics/ergonomics and assess and modify postural abnormalities to attain remaining goals. [x]  See Plan of Care  []  See progress note/recertification  []  See Discharge Summary         Progress towards goals / Updated goals: Patient making steady improvements in exercise tolerance and decreased elbow discomfort. Short Term Goals: To be accomplished in 4 weeks:  1. Patient will be independent with initial HEP in order to transition to general wellness program. MET  2. Patient will demonstrate appropriate knee flexion in ambulation with <3/10 pain to progress return to community ambulation. MET  3. Patient will progress AROM elbow exercises to 1# resistance to progress return to curling her hair. Not attempted 2/2 initial focus on knee   Long Term Goals: To be accomplished in 12 weeks:  1. Patient will demonstrate normal gait mechanics for >500 ft with <2/10 pain to return to community ambulation MET  2. Patient will be able to vacuum her house without modification to return to premorbid status in caring for her home. Progressing toward- remains careful   3. Patient will be able to ascend/descend stairs with reciprocal pattern and 1 UE assistance with <2/10 pain to increase independence in the community.  Progressing toward- inconsistent function     PLAN  [x]  Upgrade activities as tolerated     [x]  Continue plan of care  [x]  Update interventions per flow sheet       []  Discharge due to:_  []  Other:_      Aleksandar Stephens 6/22/2021

## 2021-06-24 ENCOUNTER — HOSPITAL ENCOUNTER (OUTPATIENT)
Dept: PHYSICAL THERAPY | Age: 71
Discharge: HOME | End: 2021-06-24
Payer: MEDICARE

## 2021-06-24 PROCEDURE — 97110 THERAPEUTIC EXERCISES: CPT

## 2021-06-24 PROCEDURE — 97140 MANUAL THERAPY 1/> REGIONS: CPT

## 2021-06-24 NOTE — PROGRESS NOTES
PT DAILY TREATMENT NOTE - [facility] 2-15    Patient Name: Charisma Doyle  Date:2021  : 1950  [x]  Patient  Verified  Payor: Sylvia Boland / Plan: [place] / Product Type: Medicare /    In time: 4:00 p Out time: 4:55  Total Treatment Time (min): 55  Total Timed Codes (min): 55  1:1 Treatment Time ( [address] only): 54  Visit #: 14      Treatment Area: Left knee pain [M25.562]  Left elbow pain [M25.522]    SUBJECTIVE  Pain Level (0-10 scale): 0  Any medication changes, allergies to medications, adverse drug reactions, diagnosis change, or new procedure performed?: [x] No    [] Yes (see summary sheet for update)  Subjective functional status/changes:   [] No changes reported  \"I can't believe it but I think I've turned a corner. The knee felt like it hit a plateau but all of a sudden it's feeling better! \"     OBJECTIVE      40 min Therapeutic Exercise:  [x] See flow sheet :   Rationale: increase ROM, increase strength and improve coordination to improve the patients ability to ADLs, sitting, standing and walking tolerance. 15 min Manual Therapy: Popliteal fossa, medial proximal gastroc, distal hamstring    Rationale: decrease pain, increase ROM and increase tissue extensibility to improve the patients ability to sleep at night           With   [x] TE   [] TA   [] neuro   [] other: Patient Education: [x] Review HEP    [] Progressed/Changed HEP based on:   [] positioning   [] body mechanics   [] transfers   [] heat/ice application    [] other:      Other Objective/Functional Measures:      Pain Level (0-10 scale) post treatment: 0    ASSESSMENT/Changes in Function:   Progression of wrist extension weight and bicep curl repetition, no pain but increased fatigue. Slight pain noted with supination exercise.          Patient will continue to benefit from skilled PT services to modify and progress therapeutic interventions, address functional mobility deficits, address ROM deficits, address strength deficits, analyze and address soft tissue restrictions, analyze and cue movement patterns, analyze and modify body mechanics/ergonomics and assess and modify postural abnormalities to attain remaining goals. [x]  See Plan of Care  []  See progress note/recertification  []  See Discharge Summary         Progress towards goals / Updated goals: Patient making steady improvements in exercise tolerance and decreased elbow discomfort. Short Term Goals: To be accomplished in 4 weeks:  1. Patient will be independent with initial HEP in order to transition to general wellness program. MET  2. Patient will demonstrate appropriate knee flexion in ambulation with <3/10 pain to progress return to community ambulation. MET  3. Patient will progress AROM elbow exercises to 1# resistance to progress return to curling her hair. MET  Long Term Goals: To be accomplished in 12 weeks:  1. Patient will demonstrate normal gait mechanics for >500 ft with <2/10 pain to return to community ambulation MET  2. Patient will be able to vacuum her house without modification to return to premorbid status in caring for her home. Progressing toward- remains careful   3. Patient will be able to ascend/descend stairs with reciprocal pattern and 1 UE assistance with <2/10 pain to increase independence in the community.  Progressing toward- inconsistent function     PLAN  [x]  Upgrade activities as tolerated     [x]  Continue plan of care  [x]  Update interventions per flow sheet       []  Discharge due to:_  []  Other:_      Naya Humphrey DPT 6/24/2021

## 2021-06-25 ENCOUNTER — APPOINTMENT (OUTPATIENT)
Dept: PHYSICAL THERAPY | Age: 71
End: 2021-06-25
Payer: MEDICARE

## 2021-06-30 ENCOUNTER — HOSPITAL ENCOUNTER (OUTPATIENT)
Dept: PHYSICAL THERAPY | Age: 71
Discharge: HOME | End: 2021-06-30
Payer: MEDICARE

## 2021-06-30 PROCEDURE — 97110 THERAPEUTIC EXERCISES: CPT

## 2021-06-30 PROCEDURE — 97140 MANUAL THERAPY 1/> REGIONS: CPT

## 2021-06-30 NOTE — PROGRESS NOTES
PT DAILY TREATMENT NOTE - [facility] 2-15    Patient Name: Kieran Sessions  Date:2021  : 1950  [x]  Patient  Verified  Payor: [place] MEDICARE / Plan: [place] MEDICARE PART A & B / Product Type: Medicare /    In time: 9:20a Out time: 10:10a  Total Treatment Time (min): 50  Total Timed Codes (min): 50  1:1 Treatment Time ([facility] only): 40  Visit #: 15      Treatment Area: Left knee pain [M25.562]  Left elbow pain [M25.522]    SUBJECTIVE  Pain Level (0-10 scale): 0  Any medication changes, allergies to medications, adverse drug reactions, diagnosis change, or new procedure performed?: [x] No    [] Yes (see summary sheet for update)  Subjective functional status/changes:   [] No changes reported  Patient reports she had some pain through her knee after walking around [address] [place] yesterday, but is feeling better with no pain today. Patient reports she was on her phone in bed the other night and her elbow has been bothering her a little since. OBJECTIVE      40 min Therapeutic Exercise:  [x] See flow sheet :   Rationale: increase ROM, increase strength and improve coordination to improve the patients ability to ADLs, sitting, standing and walking tolerance. 10 min Manual Therapy: Popliteal fossa, medial proximal gastroc, distal hamstring    Rationale: decrease pain, increase ROM and increase tissue extensibility to improve the patients ability to sleep at night           With   [x] TE   [] TA   [] neuro   [] other: Patient Education: [x] Review HEP    [] Progressed/Changed HEP based on:   [] positioning   [] body mechanics   [] transfers   [] heat/ice application    [] other:      Other Objective/Functional Measures:      Pain Level (0-10 scale) post treatment: 0    ASSESSMENT/Changes in Function:   Progression of several wrist interventions with increased fatigue noted, no increased pain. Discussed elbow brace use and when/how to discontinue use.         Patient will continue to benefit from skilled PT services to modify and progress therapeutic interventions, address functional mobility deficits, address ROM deficits, address strength deficits, analyze and address soft tissue restrictions, analyze and cue movement patterns, analyze and modify body mechanics/ergonomics and assess and modify postural abnormalities to attain remaining goals. [x]  See Plan of Care  []  See progress note/recertification  []  See Discharge Summary         Progress towards goals / Updated goals: Patient making steady improvements in exercise tolerance and decreased elbow discomfort. Short Term Goals: To be accomplished in 4 weeks:  1. Patient will be independent with initial HEP in order to transition to general wellness program. MET  2. Patient will demonstrate appropriate knee flexion in ambulation with <3/10 pain to progress return to community ambulation. MET  3. Patient will progress AROM elbow exercises to 1# resistance to progress return to curling her hair. MET  Long Term Goals: To be accomplished in 12 weeks:  1. Patient will demonstrate normal gait mechanics for >500 ft with <2/10 pain to return to community ambulation MET  2. Patient will be able to vacuum her house without modification to return to premorbid status in caring for her home. Progressing toward- remains careful   3. Patient will be able to ascend/descend stairs with reciprocal pattern and 1 UE assistance with <2/10 pain to increase independence in the community.  Progressing toward- inconsistent function     PLAN  [x]  Upgrade activities as tolerated     [x]  Continue plan of care  [x]  Update interventions per flow sheet       []  Discharge due to:_  []  Other:_      Chrissie Richter, PTA 6/30/2021

## 2021-07-02 ENCOUNTER — APPOINTMENT (OUTPATIENT)
Dept: PHYSICAL THERAPY | Age: 71
End: 2021-07-02
Payer: MEDICARE

## 2021-07-05 ENCOUNTER — HOSPITAL ENCOUNTER (OUTPATIENT)
Dept: PHYSICAL THERAPY | Age: 71
Discharge: HOME | End: 2021-07-05
Payer: MEDICARE

## 2021-07-05 PROCEDURE — 97140 MANUAL THERAPY 1/> REGIONS: CPT

## 2021-07-05 PROCEDURE — 97110 THERAPEUTIC EXERCISES: CPT

## 2021-07-05 NOTE — PROGRESS NOTES
PT DAILY TREATMENT NOTE - [facility] 2-15    Patient Name: Brendan Antunez  Date:2021  : 1950  [x]  Patient  Verified  Payor: [place] MEDICARE / Plan: [place] MEDICARE PART A & B / Product Type: Medicare /    In time: 10:50 a Out time: 11:45 a  Total Treatment Time (min): 55  Total Timed Codes (min): 55  1:1 Treatment Time ([facility] only): 55  Visit #: 16      Treatment Area: Left knee pain [M25.562]  Left elbow pain [M25.522]    SUBJECTIVE  Pain Level (0-10 scale): 0  Any medication changes, allergies to medications, adverse drug reactions, diagnosis change, or new procedure performed?: [x] No    [] Yes (see summary sheet for update)  Subjective functional status/changes:   [] No changes reported  Patient reports that on Friday her leg felt \"almost like normal\" but then she walked up and down the stairs yesterday too many times and \"it felt just like when I first started\". She iced yesterday and today it feels much better. OBJECTIVE      45 min Therapeutic Exercise:  [x] See flow sheet :   Rationale: increase ROM, increase strength and improve coordination to improve the patients ability to ADLs, sitting, standing and walking tolerance. 10 min Manual Therapy: Popliteal fossa, medial proximal gastroc, distal hamstring    Rationale: decrease pain, increase ROM and increase tissue extensibility to improve the patients ability to sleep at night           With   [x] TE   [] TA   [] neuro   [] other: Patient Education: [x] Review HEP    [] Progressed/Changed HEP based on:   [] positioning   [] body mechanics   [] transfers   [] heat/ice application    [] other:      Other Objective/Functional Measures:      Pain Level (0-10 scale) post treatment: 0    ASSESSMENT/Changes in Function:     Redy to progress weight for supination/pronation exercise next session. Eccentric wrist flexion was symptomatic for pain proximal to olecranon, improvement in status with repetitions.     Patient will continue to benefit from skilled PT services to modify and progress therapeutic interventions, address functional mobility deficits, address ROM deficits, address strength deficits, analyze and address soft tissue restrictions, analyze and cue movement patterns, analyze and modify body mechanics/ergonomics and assess and modify postural abnormalities to attain remaining goals. [x]  See Plan of Care  []  See progress note/recertification  []  See Discharge Summary         Progress towards goals / Updated goals: Patient making steady improvements in exercise tolerance and decreased elbow discomfort. Short Term Goals: To be accomplished in 4 weeks:  1. Patient will be independent with initial HEP in order to transition to general wellness program. MET  2. Patient will demonstrate appropriate knee flexion in ambulation with <3/10 pain to progress return to community ambulation. MET  3. Patient will progress AROM elbow exercises to 1# resistance to progress return to curling her hair. MET  Long Term Goals: To be accomplished in 12 weeks:  1. Patient will demonstrate normal gait mechanics for >500 ft with <2/10 pain to return to community ambulation MET  2. Patient will be able to vacuum her house without modification to return to premorbid status in caring for her home. Progressing toward- remains careful   3. Patient will be able to ascend/descend stairs with reciprocal pattern and 1 UE assistance with <2/10 pain to increase independence in the community.  Progressing toward- inconsistent function     PLAN  [x]  Upgrade activities as tolerated     [x]  Continue plan of care  [x]  Update interventions per flow sheet       []  Discharge due to:_  []  Other:_      Yuni Mcdonough DPT 7/5/2021

## 2021-07-07 ENCOUNTER — HOSPITAL ENCOUNTER (OUTPATIENT)
Dept: PHYSICAL THERAPY | Age: 71
Discharge: HOME | End: 2021-07-07
Payer: MEDICARE

## 2021-07-07 PROCEDURE — 97110 THERAPEUTIC EXERCISES: CPT

## 2021-07-07 NOTE — PROGRESS NOTES
PT DAILY TREATMENT NOTE - [facility] 2-15    Patient Name: Marita Gonzalez  Date:2021  : 1950  [x]  Patient  Verified  Payor: [place] MEDICARE / Plan: [place] MEDICARE PART A & B / Product Type: Medicare /    In time: 10:45a Out time: 11:40a  Total Treatment Time (min): 55  Total Timed Codes (min): 55  1:1 Treatment Time ( only): 54  Visit #: 17      Treatment Area: Left knee pain [M25.562]  Left elbow pain [M25.522]    SUBJECTIVE  Pain Level (0-10 scale): 0  Any medication changes, allergies to medications, adverse drug reactions, diagnosis change, or new procedure performed?: [x] No    [] Yes (see summary sheet for update)  Subjective functional status/changes:   [] No changes reported  Patient reports that on Friday her leg felt \"almost like normal\" but then she walked up and down the stairs yesterday too many times and \"it felt just like when I first started\". She iced yesterday and today it feels much better. OBJECTIVE      55 min Therapeutic Exercise:  [x] See flow sheet :    Rationale: increase ROM, increase strength and improve coordination to improve the patients ability to ADLs, sitting, standing and walking tolerance.     NT min Manual Therapy: Popliteal fossa, medial proximal gastroc, distal hamstring    Rationale: decrease pain, increase ROM and increase tissue extensibility to improve the patients ability to sleep at night           With   [x] TE   [] TA   [] neuro   [] other: Patient Education: [x] Review HEP    [] Progressed/Changed HEP based on:   [] positioning   [] body mechanics   [] transfers   [] heat/ice application    [] other:      Other Objective/Functional Measures:      Pain Level (0-10 scale) post treatment: 0    ASSESSMENT/Changes in Function: reviewed activity modifications to reduce pain while performing ADL's, including vacuuming and ironing, pain with ascending/descending steps, no pain with verbal cues for pressing through heel and wider SOHA to avoid medial knee pressure from tandem step ascension. Redy to progress weight for supination/pronation exercise next session. Eccentric wrist flexion was symptomatic for pain proximal to olecranon, improvement in status with repetitions. Patient will continue to benefit from skilled PT services to modify and progress therapeutic interventions, address functional mobility deficits, address ROM deficits, address strength deficits, analyze and address soft tissue restrictions, analyze and cue movement patterns, analyze and modify body mechanics/ergonomics and assess and modify postural abnormalities to attain remaining goals. [x]  See Plan of Care  []  See progress note/recertification  []  See Discharge Summary         Progress towards goals / Updated goals: Patient has attended 7 therapy visits and is making steady improvements towards goals. She has met all her short term goals and is making slow overall progress towards long term goals. She would do well with continued progression of therapeutic exercises to further address continued limitation with ADL's and steps and improve functional mobility and endurance through BLE and UE. Short Term Goals: To be accomplished in 4 weeks:  1. Patient will be independent with initial HEP in order to transition to general wellness program. MET  2. Patient will demonstrate appropriate knee flexion in ambulation with <3/10 pain to progress return to community ambulation. MET  3. Patient will progress AROM elbow exercises to 1# resistance to progress return to curling her hair. MET  Long Term Goals: To be accomplished in 12 weeks:  1. Patient will demonstrate normal gait mechanics for >500 ft with <2/10 pain to return to community ambulation MET  2. Patient will be able to vacuum her house without modification to return to premorbid status in caring for her home. Progressing toward- remains careful   3.  Patient will be able to ascend/descend stairs with reciprocal pattern and 1 UE assistance with <2/10 pain to increase independence in the community.  Progressing toward- inconsistent function     PLAN  [x]  Upgrade activities as tolerated     [x]  Continue plan of care  [x]  Update interventions per flow sheet       []  Discharge due to:_  []  Other:_      Whit Huston, PTA 7/7/2021

## 2021-07-14 ENCOUNTER — APPOINTMENT (OUTPATIENT)
Dept: PHYSICAL THERAPY | Age: 71
End: 2021-07-14
Payer: MEDICARE

## 2021-07-21 ENCOUNTER — APPOINTMENT (OUTPATIENT)
Dept: PHYSICAL THERAPY | Age: 71
End: 2021-07-21
Payer: MEDICARE

## 2021-10-07 NOTE — PROGRESS NOTES
Physical Therapy at [facility],   a part of [facility]  [address] [place] [address]  [place], [address]  Phone: [phone] Fax: [phone]      Discharge Summary 2-15    Patient name: Cristina Cifuentes  : 1950  Provider#: [id number]  Referral source: Sen Ann MD      Medical/Treatment Diagnosis: Left knee pain [M25.562]  Left elbow pain [M25.522]     Prior Hospitalization: see medical history     Comorbidities: See Plan of Care  Prior Level of Function: See Plan of Care  Medications: Verified on Patient Summary List    Start of Care: 21      Onset Date:21   Visits from Start of Care: 17     Missed Visits: 0  Reporting Period : 21 to 21    Assessment/Summary of care: Patient has abdicated and is assumed self-discharged at this time. She made great progress in reducing both knee and elbow pain with good overall improvement in functional tolerance. She improved her FOTO for LE score from 41/100 to 52/100. Goal attainment below per last attended visit. Short Term Goals: To be accomplished in 4 weeks:  1. Patient will be independent with initial HEP in order to transition to general wellness program. MET  2. Patient will demonstrate appropriate knee flexion in ambulation with <3/10 pain to progress return to community ambulation. MET  3. Patient will progress AROM elbow exercises to 1# resistance to progress return to curling her hair. MET  Long Term Goals: To be accomplished in 12 weeks:  1. Patient will demonstrate normal gait mechanics for >500 ft with <2/10 pain to return to community ambulation MET  2. Patient will be able to vacuum her house without modification to return to premorbid status in caring for her home. Remains cautious   3. Patient will be able to ascend/descend stairs with reciprocal pattern and 1 UE assistance with <2/10 pain to increase independence in the community.  MET    RECOMMENDATIONS:  [x]Discontinue therapy: []Patient has reached or is progressing toward set goals     [x]Patient is non-compliant or has abdicated     []Due to lack of appreciable progress towards set goals     []Other  Stu Benavidez DPT 10/7/2021

## 2022-03-19 PROBLEM — E55.9 VITAMIN D DEFICIENCY: Status: ACTIVE | Noted: 2017-12-12

## 2022-03-19 PROBLEM — E78.00 PURE HYPERCHOLESTEROLEMIA: Status: ACTIVE | Noted: 2017-12-12

## 2022-03-19 PROBLEM — E03.9 ACQUIRED HYPOTHYROIDISM: Status: ACTIVE | Noted: 2017-12-12

## 2022-03-20 PROBLEM — G25.0 FAMILIAL TREMOR: Status: ACTIVE | Noted: 2017-12-12

## 2024-01-17 ENCOUNTER — APPOINTMENT (OUTPATIENT)
Facility: HOSPITAL | Age: 74
End: 2024-01-17
Payer: MEDICARE

## 2024-01-17 ENCOUNTER — HOSPITAL ENCOUNTER (INPATIENT)
Facility: HOSPITAL | Age: 74
LOS: 2 days | Discharge: HOME | End: 2024-01-19
Attending: STUDENT IN AN ORGANIZED HEALTH CARE EDUCATION/TRAINING PROGRAM | Admitting: INTERNAL MEDICINE
Payer: MEDICARE

## 2024-01-17 DIAGNOSIS — J10.1 INFLUENZA A: ICD-10-CM

## 2024-01-17 DIAGNOSIS — R41.82 ALTERED MENTAL STATUS, UNSPECIFIED ALTERED MENTAL STATUS TYPE: ICD-10-CM

## 2024-01-17 DIAGNOSIS — I66.9 ASYMPTOMATIC STENOSIS OF INTRACRANIAL ARTERY: Primary | ICD-10-CM

## 2024-01-17 DIAGNOSIS — R74.8 ELEVATED CK: ICD-10-CM

## 2024-01-17 DIAGNOSIS — R79.89 ELEVATED SERUM CREATININE: ICD-10-CM

## 2024-01-17 PROBLEM — M62.82 RHABDOMYOLYSIS: Status: ACTIVE | Noted: 2024-01-17

## 2024-01-17 PROBLEM — G93.41 ACUTE METABOLIC ENCEPHALOPATHY: Status: ACTIVE | Noted: 2024-01-17

## 2024-01-17 LAB
ALBUMIN SERPL-MCNC: 3.4 G/DL (ref 3.5–5)
ALBUMIN/GLOB SERPL: 1 (ref 1.1–2.2)
ALP SERPL-CCNC: 84 U/L (ref 45–117)
ALT SERPL-CCNC: 21 U/L (ref 12–78)
ANION GAP SERPL CALC-SCNC: 9 MMOL/L (ref 5–15)
APPEARANCE UR: CLEAR
AST SERPL-CCNC: 54 U/L (ref 15–37)
BACTERIA URNS QL MICRO: NEGATIVE /HPF
BASOPHILS # BLD: 0 K/UL (ref 0–0.1)
BASOPHILS NFR BLD: 0 % (ref 0–1)
BILIRUB SERPL-MCNC: 0.5 MG/DL (ref 0.2–1)
BILIRUB UR QL: NEGATIVE
BUN SERPL-MCNC: 18 MG/DL (ref 6–20)
BUN/CREAT SERPL: 12 (ref 12–20)
CALCIUM SERPL-MCNC: 8.5 MG/DL (ref 8.5–10.1)
CHLORIDE SERPL-SCNC: 103 MMOL/L (ref 97–108)
CK SERPL-CCNC: 988 U/L (ref 26–192)
CO2 SERPL-SCNC: 25 MMOL/L (ref 21–32)
COLOR UR: ABNORMAL
CREAT SERPL-MCNC: 1.47 MG/DL (ref 0.55–1.02)
DIFFERENTIAL METHOD BLD: ABNORMAL
EOSINOPHIL # BLD: 0 K/UL (ref 0–0.4)
EOSINOPHIL NFR BLD: 0 % (ref 0–7)
EPITH CASTS URNS QL MICRO: ABNORMAL /LPF
ERYTHROCYTE [DISTWIDTH] IN BLOOD BY AUTOMATED COUNT: 13.1 % (ref 11.5–14.5)
FLUAV AG NPH QL IA: POSITIVE
FLUBV AG NOSE QL IA: NEGATIVE
GLOBULIN SER CALC-MCNC: 3.4 G/DL (ref 2–4)
GLUCOSE BLD STRIP.AUTO-MCNC: 115 MG/DL (ref 65–117)
GLUCOSE SERPL-MCNC: 125 MG/DL (ref 65–100)
GLUCOSE UR STRIP.AUTO-MCNC: NEGATIVE MG/DL
HCT VFR BLD AUTO: 39.5 % (ref 35–47)
HGB BLD-MCNC: 13.4 G/DL (ref 11.5–16)
HGB UR QL STRIP: ABNORMAL
IMM GRANULOCYTES # BLD AUTO: 0.1 K/UL (ref 0–0.04)
IMM GRANULOCYTES NFR BLD AUTO: 1 % (ref 0–0.5)
INR PPP: 1.2 (ref 0.9–1.1)
KETONES UR QL STRIP.AUTO: NEGATIVE MG/DL
LACTATE SERPL-SCNC: 1.8 MMOL/L (ref 0.4–2)
LEUKOCYTE ESTERASE UR QL STRIP.AUTO: NEGATIVE
LYMPHOCYTES # BLD: 0.3 K/UL (ref 0.8–3.5)
LYMPHOCYTES NFR BLD: 4 % (ref 12–49)
MCH RBC QN AUTO: 29.8 PG (ref 26–34)
MCHC RBC AUTO-ENTMCNC: 33.9 G/DL (ref 30–36.5)
MCV RBC AUTO: 88 FL (ref 80–99)
MONOCYTES # BLD: 0.5 K/UL (ref 0–1)
MONOCYTES NFR BLD: 6 % (ref 5–13)
NEUTS SEG # BLD: 6.8 K/UL (ref 1.8–8)
NEUTS SEG NFR BLD: 89 % (ref 32–75)
NITRITE UR QL STRIP.AUTO: NEGATIVE
NRBC # BLD: 0 K/UL (ref 0–0.01)
NRBC BLD-RTO: 0 PER 100 WBC
PH UR STRIP: 6.5 (ref 5–8)
PLATELET # BLD AUTO: 142 K/UL (ref 150–400)
PMV BLD AUTO: 11.6 FL (ref 8.9–12.9)
POTASSIUM SERPL-SCNC: 3.5 MMOL/L (ref 3.5–5.1)
PROCALCITONIN SERPL-MCNC: 0.34 NG/ML
PROT SERPL-MCNC: 6.8 G/DL (ref 6.4–8.2)
PROT UR STRIP-MCNC: ABNORMAL MG/DL
PROTHROMBIN TIME: 11.6 SEC (ref 9–11.1)
RBC # BLD AUTO: 4.49 M/UL (ref 3.8–5.2)
RBC #/AREA URNS HPF: ABNORMAL /HPF (ref 0–5)
RBC MORPH BLD: ABNORMAL
SARS-COV-2 RDRP RESP QL NAA+PROBE: NOT DETECTED
SERVICE CMNT-IMP: NORMAL
SODIUM SERPL-SCNC: 137 MMOL/L (ref 136–145)
SOURCE: NORMAL
SP GR UR REFRACTOMETRY: >1.03 (ref 1–1.03)
TROPONIN I SERPL HS-MCNC: 28 NG/L (ref 0–51)
URINE CULTURE IF INDICATED: ABNORMAL
UROBILINOGEN UR QL STRIP.AUTO: 0.2 EU/DL (ref 0.2–1)
WBC # BLD AUTO: 7.7 K/UL (ref 3.6–11)
WBC URNS QL MICRO: ABNORMAL /HPF (ref 0–4)

## 2024-01-17 PROCEDURE — 1100000000 HC RM PRIVATE

## 2024-01-17 PROCEDURE — 71045 X-RAY EXAM CHEST 1 VIEW: CPT

## 2024-01-17 PROCEDURE — 83605 ASSAY OF LACTIC ACID: CPT

## 2024-01-17 PROCEDURE — 2580000003 HC RX 258: Performed by: STUDENT IN AN ORGANIZED HEALTH CARE EDUCATION/TRAINING PROGRAM

## 2024-01-17 PROCEDURE — 70496 CT ANGIOGRAPHY HEAD: CPT

## 2024-01-17 PROCEDURE — 71250 CT THORAX DX C-: CPT

## 2024-01-17 PROCEDURE — 82962 GLUCOSE BLOOD TEST: CPT

## 2024-01-17 PROCEDURE — 87635 SARS-COV-2 COVID-19 AMP PRB: CPT

## 2024-01-17 PROCEDURE — 84145 PROCALCITONIN (PCT): CPT

## 2024-01-17 PROCEDURE — 81001 URINALYSIS AUTO W/SCOPE: CPT

## 2024-01-17 PROCEDURE — 85610 PROTHROMBIN TIME: CPT

## 2024-01-17 PROCEDURE — 4A03X5D MEASUREMENT OF ARTERIAL FLOW, INTRACRANIAL, EXTERNAL APPROACH: ICD-10-PCS | Performed by: STUDENT IN AN ORGANIZED HEALTH CARE EDUCATION/TRAINING PROGRAM

## 2024-01-17 PROCEDURE — 85025 COMPLETE CBC W/AUTO DIFF WBC: CPT

## 2024-01-17 PROCEDURE — 6370000000 HC RX 637 (ALT 250 FOR IP): Performed by: INTERNAL MEDICINE

## 2024-01-17 PROCEDURE — 99285 EMERGENCY DEPT VISIT HI MDM: CPT

## 2024-01-17 PROCEDURE — 6360000004 HC RX CONTRAST MEDICATION: Performed by: STUDENT IN AN ORGANIZED HEALTH CARE EDUCATION/TRAINING PROGRAM

## 2024-01-17 PROCEDURE — 84484 ASSAY OF TROPONIN QUANT: CPT

## 2024-01-17 PROCEDURE — 87804 INFLUENZA ASSAY W/OPTIC: CPT

## 2024-01-17 PROCEDURE — 87040 BLOOD CULTURE FOR BACTERIA: CPT

## 2024-01-17 PROCEDURE — 82550 ASSAY OF CK (CPK): CPT

## 2024-01-17 PROCEDURE — 36415 COLL VENOUS BLD VENIPUNCTURE: CPT

## 2024-01-17 PROCEDURE — 6370000000 HC RX 637 (ALT 250 FOR IP): Performed by: STUDENT IN AN ORGANIZED HEALTH CARE EDUCATION/TRAINING PROGRAM

## 2024-01-17 PROCEDURE — 70450 CT HEAD/BRAIN W/O DYE: CPT

## 2024-01-17 PROCEDURE — 80053 COMPREHEN METABOLIC PANEL: CPT

## 2024-01-17 PROCEDURE — 93005 ELECTROCARDIOGRAM TRACING: CPT | Performed by: STUDENT IN AN ORGANIZED HEALTH CARE EDUCATION/TRAINING PROGRAM

## 2024-01-17 RX ORDER — BENZONATATE 100 MG/1
100 CAPSULE ORAL 3 TIMES DAILY PRN
Status: DISCONTINUED | OUTPATIENT
Start: 2024-01-17 | End: 2024-01-19 | Stop reason: HOSPADM

## 2024-01-17 RX ORDER — ENOXAPARIN SODIUM 100 MG/ML
30 INJECTION SUBCUTANEOUS DAILY
Status: DISCONTINUED | OUTPATIENT
Start: 2024-01-18 | End: 2024-01-18

## 2024-01-17 RX ORDER — SENNA AND DOCUSATE SODIUM 50; 8.6 MG/1; MG/1
1 TABLET, FILM COATED ORAL 2 TIMES DAILY
Status: DISCONTINUED | OUTPATIENT
Start: 2024-01-17 | End: 2024-01-19 | Stop reason: HOSPADM

## 2024-01-17 RX ORDER — LEVOTHYROXINE SODIUM 0.03 MG/1
25 TABLET ORAL DAILY
COMMUNITY

## 2024-01-17 RX ORDER — SODIUM CHLORIDE 9 MG/ML
INJECTION, SOLUTION INTRAVENOUS PRN
Status: DISCONTINUED | OUTPATIENT
Start: 2024-01-17 | End: 2024-01-19 | Stop reason: HOSPADM

## 2024-01-17 RX ORDER — ACETAMINOPHEN 160 MG
TABLET,DISINTEGRATING ORAL
COMMUNITY

## 2024-01-17 RX ORDER — SODIUM CHLORIDE 9 MG/ML
INJECTION, SOLUTION INTRAVENOUS CONTINUOUS
Status: DISCONTINUED | OUTPATIENT
Start: 2024-01-17 | End: 2024-01-19

## 2024-01-17 RX ORDER — OSELTAMIVIR PHOSPHATE 30 MG/1
30 CAPSULE ORAL DAILY
Status: DISCONTINUED | OUTPATIENT
Start: 2024-01-18 | End: 2024-01-18

## 2024-01-17 RX ORDER — SODIUM CHLORIDE 0.9 % (FLUSH) 0.9 %
5-40 SYRINGE (ML) INJECTION EVERY 12 HOURS SCHEDULED
Status: DISCONTINUED | OUTPATIENT
Start: 2024-01-17 | End: 2024-01-19 | Stop reason: HOSPADM

## 2024-01-17 RX ORDER — IPRATROPIUM BROMIDE AND ALBUTEROL SULFATE 2.5; .5 MG/3ML; MG/3ML
1 SOLUTION RESPIRATORY (INHALATION) EVERY 4 HOURS PRN
Status: DISCONTINUED | OUTPATIENT
Start: 2024-01-17 | End: 2024-01-19 | Stop reason: HOSPADM

## 2024-01-17 RX ORDER — POLYETHYLENE GLYCOL 3350 17 G/17G
17 POWDER, FOR SOLUTION ORAL DAILY PRN
Status: DISCONTINUED | OUTPATIENT
Start: 2024-01-17 | End: 2024-01-19 | Stop reason: HOSPADM

## 2024-01-17 RX ORDER — LEVOTHYROXINE SODIUM 0.03 MG/1
25 TABLET ORAL DAILY
Status: DISCONTINUED | OUTPATIENT
Start: 2024-01-18 | End: 2024-01-19 | Stop reason: HOSPADM

## 2024-01-17 RX ORDER — GUAIFENESIN 600 MG/1
600 TABLET, EXTENDED RELEASE ORAL 2 TIMES DAILY
Status: DISCONTINUED | OUTPATIENT
Start: 2024-01-17 | End: 2024-01-19 | Stop reason: HOSPADM

## 2024-01-17 RX ORDER — 0.9 % SODIUM CHLORIDE 0.9 %
1000 INTRAVENOUS SOLUTION INTRAVENOUS ONCE
Status: COMPLETED | OUTPATIENT
Start: 2024-01-17 | End: 2024-01-18

## 2024-01-17 RX ORDER — ACETAMINOPHEN 650 MG/1
650 SUPPOSITORY RECTAL EVERY 6 HOURS PRN
Status: DISCONTINUED | OUTPATIENT
Start: 2024-01-17 | End: 2024-01-19 | Stop reason: HOSPADM

## 2024-01-17 RX ORDER — ONDANSETRON 2 MG/ML
4 INJECTION INTRAMUSCULAR; INTRAVENOUS EVERY 6 HOURS PRN
Status: DISCONTINUED | OUTPATIENT
Start: 2024-01-17 | End: 2024-01-19 | Stop reason: HOSPADM

## 2024-01-17 RX ORDER — SODIUM CHLORIDE 0.9 % (FLUSH) 0.9 %
5-40 SYRINGE (ML) INJECTION PRN
Status: DISCONTINUED | OUTPATIENT
Start: 2024-01-17 | End: 2024-01-19 | Stop reason: HOSPADM

## 2024-01-17 RX ORDER — OSELTAMIVIR PHOSPHATE 30 MG/1
30 CAPSULE ORAL ONCE
Status: COMPLETED | OUTPATIENT
Start: 2024-01-17 | End: 2024-01-17

## 2024-01-17 RX ORDER — ACETAMINOPHEN 325 MG/1
650 TABLET ORAL EVERY 6 HOURS PRN
Status: DISCONTINUED | OUTPATIENT
Start: 2024-01-17 | End: 2024-01-19 | Stop reason: HOSPADM

## 2024-01-17 RX ADMIN — SODIUM CHLORIDE 1000 ML: 9 INJECTION, SOLUTION INTRAVENOUS at 22:55

## 2024-01-17 RX ADMIN — ACETAMINOPHEN 650 MG: 325 TABLET ORAL at 23:22

## 2024-01-17 RX ADMIN — IOPAMIDOL 100 ML: 755 INJECTION, SOLUTION INTRAVENOUS at 20:27

## 2024-01-17 RX ADMIN — OSELTAMIVIR 30 MG: 30 CAPSULE ORAL at 22:52

## 2024-01-17 ASSESSMENT — PAIN - FUNCTIONAL ASSESSMENT: PAIN_FUNCTIONAL_ASSESSMENT: NONE - DENIES PAIN

## 2024-01-18 LAB
ALBUMIN SERPL-MCNC: 2.9 G/DL (ref 3.5–5)
ALBUMIN/GLOB SERPL: 1 (ref 1.1–2.2)
ALP SERPL-CCNC: 74 U/L (ref 45–117)
ALT SERPL-CCNC: 18 U/L (ref 12–78)
AMMONIA PLAS-SCNC: <10 UMOL/L
ANION GAP SERPL CALC-SCNC: 7 MMOL/L (ref 5–15)
AST SERPL-CCNC: 62 U/L (ref 15–37)
BASOPHILS # BLD: 0 K/UL (ref 0–0.1)
BASOPHILS NFR BLD: 0 % (ref 0–1)
BILIRUB SERPL-MCNC: 0.5 MG/DL (ref 0.2–1)
BUN SERPL-MCNC: 16 MG/DL (ref 6–20)
BUN/CREAT SERPL: 12 (ref 12–20)
CALCIUM SERPL-MCNC: 7.9 MG/DL (ref 8.5–10.1)
CHLORIDE SERPL-SCNC: 107 MMOL/L (ref 97–108)
CHOLEST SERPL-MCNC: 146 MG/DL
CK SERPL-CCNC: 1015 U/L (ref 26–192)
CO2 SERPL-SCNC: 22 MMOL/L (ref 21–32)
CREAT SERPL-MCNC: 1.29 MG/DL (ref 0.55–1.02)
CRP SERPL-MCNC: 11.4 MG/DL (ref 0–0.6)
DIFFERENTIAL METHOD BLD: ABNORMAL
EKG ATRIAL RATE: 83 BPM
EKG DIAGNOSIS: NORMAL
EKG P AXIS: 51 DEGREES
EKG P-R INTERVAL: 194 MS
EKG Q-T INTERVAL: 328 MS
EKG QRS DURATION: 84 MS
EKG QTC CALCULATION (BAZETT): 385 MS
EKG R AXIS: 10 DEGREES
EKG T AXIS: 28 DEGREES
EKG VENTRICULAR RATE: 83 BPM
EOSINOPHIL # BLD: 0 K/UL (ref 0–0.4)
EOSINOPHIL NFR BLD: 0 % (ref 0–7)
ERYTHROCYTE [DISTWIDTH] IN BLOOD BY AUTOMATED COUNT: 13.2 % (ref 11.5–14.5)
EST. AVERAGE GLUCOSE BLD GHB EST-MCNC: 105 MG/DL
GLOBULIN SER CALC-MCNC: 2.8 G/DL (ref 2–4)
GLUCOSE SERPL-MCNC: 110 MG/DL (ref 65–100)
HBA1C MFR BLD: 5.3 % (ref 4–5.6)
HCT VFR BLD AUTO: 29.2 % (ref 35–47)
HDLC SERPL-MCNC: 42 MG/DL
HDLC SERPL: 3.5 (ref 0–5)
HGB BLD-MCNC: 9.8 G/DL (ref 11.5–16)
IMM GRANULOCYTES # BLD AUTO: 0 K/UL (ref 0–0.04)
IMM GRANULOCYTES NFR BLD AUTO: 1 % (ref 0–0.5)
LACTATE SERPL-SCNC: 1.2 MMOL/L (ref 0.4–2)
LDLC SERPL CALC-MCNC: 87.2 MG/DL (ref 0–100)
LYMPHOCYTES # BLD: 0.3 K/UL (ref 0.8–3.5)
LYMPHOCYTES NFR BLD: 5 % (ref 12–49)
MAGNESIUM SERPL-MCNC: 1.8 MG/DL (ref 1.6–2.4)
MCH RBC QN AUTO: 29.7 PG (ref 26–34)
MCHC RBC AUTO-ENTMCNC: 33.6 G/DL (ref 30–36.5)
MCV RBC AUTO: 88.5 FL (ref 80–99)
MONOCYTES # BLD: 0.4 K/UL (ref 0–1)
MONOCYTES NFR BLD: 7 % (ref 5–13)
NEUTS SEG # BLD: 4.6 K/UL (ref 1.8–8)
NEUTS SEG NFR BLD: 87 % (ref 32–75)
NRBC # BLD: 0 K/UL (ref 0–0.01)
NRBC BLD-RTO: 0 PER 100 WBC
PHOSPHATE SERPL-MCNC: 3.2 MG/DL (ref 2.6–4.7)
PLATELET # BLD AUTO: 105 K/UL (ref 150–400)
PMV BLD AUTO: 11.8 FL (ref 8.9–12.9)
POTASSIUM SERPL-SCNC: 3.4 MMOL/L (ref 3.5–5.1)
PROCALCITONIN SERPL-MCNC: 0.39 NG/ML
PROT SERPL-MCNC: 5.7 G/DL (ref 6.4–8.2)
RBC # BLD AUTO: 3.3 M/UL (ref 3.8–5.2)
SODIUM SERPL-SCNC: 136 MMOL/L (ref 136–145)
TRIGL SERPL-MCNC: 84 MG/DL
TSH SERPL DL<=0.05 MIU/L-ACNC: 0.4 UIU/ML (ref 0.36–3.74)
VLDLC SERPL CALC-MCNC: 16.8 MG/DL
WBC # BLD AUTO: 5.3 K/UL (ref 3.6–11)

## 2024-01-18 PROCEDURE — 6370000000 HC RX 637 (ALT 250 FOR IP): Performed by: PSYCHIATRY & NEUROLOGY

## 2024-01-18 PROCEDURE — 2580000003 HC RX 258: Performed by: INTERNAL MEDICINE

## 2024-01-18 PROCEDURE — 99223 1ST HOSP IP/OBS HIGH 75: CPT | Performed by: PSYCHIATRY & NEUROLOGY

## 2024-01-18 PROCEDURE — 6370000000 HC RX 637 (ALT 250 FOR IP): Performed by: INTERNAL MEDICINE

## 2024-01-18 PROCEDURE — 86140 C-REACTIVE PROTEIN: CPT

## 2024-01-18 PROCEDURE — 82550 ASSAY OF CK (CPK): CPT

## 2024-01-18 PROCEDURE — 97161 PT EVAL LOW COMPLEX 20 MIN: CPT

## 2024-01-18 PROCEDURE — 84443 ASSAY THYROID STIM HORMONE: CPT

## 2024-01-18 PROCEDURE — 93010 ELECTROCARDIOGRAM REPORT: CPT | Performed by: SPECIALIST

## 2024-01-18 PROCEDURE — 83036 HEMOGLOBIN GLYCOSYLATED A1C: CPT

## 2024-01-18 PROCEDURE — 36415 COLL VENOUS BLD VENIPUNCTURE: CPT

## 2024-01-18 PROCEDURE — 97165 OT EVAL LOW COMPLEX 30 MIN: CPT

## 2024-01-18 PROCEDURE — 80053 COMPREHEN METABOLIC PANEL: CPT

## 2024-01-18 PROCEDURE — 82140 ASSAY OF AMMONIA: CPT

## 2024-01-18 PROCEDURE — 84145 PROCALCITONIN (PCT): CPT

## 2024-01-18 PROCEDURE — 97116 GAIT TRAINING THERAPY: CPT

## 2024-01-18 PROCEDURE — 80061 LIPID PANEL: CPT

## 2024-01-18 PROCEDURE — 94761 N-INVAS EAR/PLS OXIMETRY MLT: CPT

## 2024-01-18 PROCEDURE — 83735 ASSAY OF MAGNESIUM: CPT

## 2024-01-18 PROCEDURE — 6370000000 HC RX 637 (ALT 250 FOR IP): Performed by: HOSPITALIST

## 2024-01-18 PROCEDURE — 84100 ASSAY OF PHOSPHORUS: CPT

## 2024-01-18 PROCEDURE — 85025 COMPLETE CBC W/AUTO DIFF WBC: CPT

## 2024-01-18 PROCEDURE — 1100000000 HC RM PRIVATE

## 2024-01-18 PROCEDURE — 83605 ASSAY OF LACTIC ACID: CPT

## 2024-01-18 RX ORDER — OSELTAMIVIR PHOSPHATE 30 MG/1
30 CAPSULE ORAL 2 TIMES DAILY
Status: DISCONTINUED | OUTPATIENT
Start: 2024-01-18 | End: 2024-01-19 | Stop reason: HOSPADM

## 2024-01-18 RX ORDER — ATORVASTATIN CALCIUM 20 MG/1
20 TABLET, FILM COATED ORAL NIGHTLY
Status: DISCONTINUED | OUTPATIENT
Start: 2024-01-18 | End: 2024-01-19 | Stop reason: HOSPADM

## 2024-01-18 RX ORDER — ENOXAPARIN SODIUM 100 MG/ML
40 INJECTION SUBCUTANEOUS DAILY
Status: DISCONTINUED | OUTPATIENT
Start: 2024-01-19 | End: 2024-01-19 | Stop reason: HOSPADM

## 2024-01-18 RX ORDER — CLOPIDOGREL BISULFATE 75 MG/1
75 TABLET ORAL DAILY
Status: DISCONTINUED | OUTPATIENT
Start: 2024-01-18 | End: 2024-01-19 | Stop reason: HOSPADM

## 2024-01-18 RX ORDER — OSELTAMIVIR PHOSPHATE 30 MG/1
30 CAPSULE ORAL 2 TIMES DAILY
Qty: 6 CAPSULE | Refills: 0 | Status: SHIPPED | OUTPATIENT
Start: 2024-01-19 | End: 2024-01-22

## 2024-01-18 RX ADMIN — LEVOTHYROXINE SODIUM 25 MCG: 0.03 TABLET ORAL at 05:05

## 2024-01-18 RX ADMIN — SODIUM CHLORIDE: 9 INJECTION, SOLUTION INTRAVENOUS at 00:09

## 2024-01-18 RX ADMIN — OSELTAMIVIR 30 MG: 30 CAPSULE ORAL at 20:28

## 2024-01-18 RX ADMIN — SODIUM CHLORIDE: 9 INJECTION, SOLUTION INTRAVENOUS at 15:54

## 2024-01-18 RX ADMIN — OSELTAMIVIR 30 MG: 30 CAPSULE ORAL at 10:11

## 2024-01-18 RX ADMIN — GUAIFENESIN 600 MG: 600 TABLET ORAL at 10:10

## 2024-01-18 RX ADMIN — SODIUM CHLORIDE: 9 INJECTION, SOLUTION INTRAVENOUS at 03:33

## 2024-01-18 RX ADMIN — GUAIFENESIN 600 MG: 600 TABLET ORAL at 20:28

## 2024-01-18 RX ADMIN — SODIUM CHLORIDE, PRESERVATIVE FREE 10 ML: 5 INJECTION INTRAVENOUS at 00:30

## 2024-01-18 RX ADMIN — ATORVASTATIN CALCIUM 20 MG: 20 TABLET, FILM COATED ORAL at 20:28

## 2024-01-18 RX ADMIN — SODIUM CHLORIDE, PRESERVATIVE FREE 10 ML: 5 INJECTION INTRAVENOUS at 20:33

## 2024-01-18 RX ADMIN — SODIUM CHLORIDE, PRESERVATIVE FREE 10 ML: 5 INJECTION INTRAVENOUS at 10:13

## 2024-01-18 RX ADMIN — CLOPIDOGREL BISULFATE 75 MG: 75 TABLET ORAL at 19:32

## 2024-01-18 NOTE — PROGRESS NOTES
Pharmacy Dosing Services: 01/18/24  Tamiflu dose change per renal protocol  Physician Dr Santamaria  Indication: Flu    Serum Creatinine Lab Results   Component Value Date/Time    CREATININE 1.29 01/18/2024 12:19 AM      Creatinine Clearance Estimated Creatinine Clearance: 32 mL/min (A) (based on SCr of 1.29 mg/dL (H)).   BUN Lab Results   Component Value Date/Time    BUN 16 01/18/2024 12:19 AM             Current regimen: Tamiflu 30 mg po daily  New regimen: Tamiflu 30 mg po BID    Pharmacist  Phil DolanD   [phone]

## 2024-01-18 NOTE — ED PROVIDER NOTES
[facility] EMERGENCY DEPT  EMERGENCY DEPARTMENT ENCOUNTER      Pt Name: Clary Mcneill  MRN: [id number]  Birthdate 1950  Date of evaluation: 2024  Provider: Dmitriy Ricks DO    CHIEF COMPLAINT       Chief Complaint   Patient presents with    Altered Mental Status         HISTORY OF PRESENT ILLNESS   (Location/Symptom, Timing/Onset, Context/Setting, Quality, Duration, Modifying Factors, Severity)  Note limiting factors.   73-year-old female who presents to the ED via EMS for altered mental status.  Patient unable to give a full HPI.  I spoke with both EMS and family.  Family reports that the patient's last known well was around 20-30 on 2024.  She has been in bed all day today with little contact.  Most recently just prior to arrival where the patient was confused and having difficulty getting words out.  She not been answering the phone earlier today, her  was out hunting.  She also additionally has had a fever for the last several days up to 103 °F.  No history of previous stroke.  History of hypothyroidism and hypercholesterolemia.            Review of External Medical Records:     Nursing Notes were reviewed.    REVIEW OF SYSTEMS    (2-9 systems for level 4, 10 or more for level 5)     Review of Systems   Unable to perform ROS: Acuity of condition   Psychiatric/Behavioral:  Positive for confusion.        Except as noted above the remainder of the review of systems was reviewed and negative.       PAST MEDICAL HISTORY     Past Medical History:   Diagnosis Date    Arthritis     Back    Hypercholesterolemia     Hypothyroid     Sciatic pain     hip         SURGICAL HISTORY       Past Surgical History:   Procedure Laterality Date     SECTION      x2    DILATION AND CURETTAGE OF UTERUS      x1    GYN  2015    CARROLL Chong         CURRENT MEDICATIONS       Previous Medications    CHOLECALCIFEROL (VITAMIN D3) 50 MCG (2000) CAPS    Take by mouth    LEVOTHYROXINE (SYNTHROID)  Abram.  He reports that he does not think that this is stroke.  He reports that she is only confused and finds no neurologic deficits.  He recommends looking for infectious cause the patient's symptoms. [WG]      ED Course User Index  [WG] Dmitriy Ricks DO [facility] Consult for Admission  11:02 PM    ED Room Number: WER14/14  Patient Name and age:  Clary Mcneill 73 y.o.  female  Working Diagnosis:   1. Altered mental status, unspecified altered mental status type    2. Influenza A    3. Elevated serum creatinine    4. Elevated CK        COVID-19 Suspicion: No  Sepsis present:  No  Reassessment needed: No  Code Status:  Full Code  Readmission: No  Isolation Requirements: no  Recommended Level of Care: telemetry  Department: [facility] - [phone]  Consulting Provider: Dr Aragon    Other:  73-year-old female needs admission for altered mental status, and influenza, elevated creatinine level.  Patient coming from home, last known well was at 2330 last evening, was suspected to possibly have either stroke or encephalopathy secondary to infection given confusion expressive aphasia and left-sided facial droop.  Patient was activated as a level 2 stroke alert was seen by teleneurology.  They did not believe it was a stroke.  Patient has had negative imaging so far.  This included CT head without showing no acute abnormality CTA head neck with contrast shows no vascular abnormalities no large vessel occlusion.  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  X-ray was performed showing some slight volume loss in the right lung base.  Given patient had a fever of 101.2 °F on arrival and unknown source I did perform CT chest abdomen pelvis without contrast given the patient had just received a contrast load and had some mild elevation of her creatinine level without previous labs for comparison.  This showed no acute finding chest abdomen pelvis, urinalysis showed no infection she does

## 2024-01-18 NOTE — PROGRESS NOTES
Patient seen and examined.   Dimitrios at bedside.  She is feeling better.  Although her mentation not completely back to baseline.  Agree with current plan.  Continue with supportive care, IV fluids, and Tamiflu.

## 2024-01-18 NOTE — ED TRIAGE NOTES
Signs and symptoms: Pt arrives via [facility] report of last known well 11am this morning pt is confused family reported new lethargy and confused has had a fever for the last few days 103 by EMS tested negative on the stroke scale glucose 112 spo2 94% 122/62 HR 90 strong smell of urine.\"  Code Stroke activation time: 2004  Provider at bedside time:  2000  VAN score: Positive  Last Known Well (Time): 11am  Blood Glucose Result/Time: 115   Blood Pressure: 119/56  Anticoagulants (List medications): \"no\" per pt

## 2024-01-18 NOTE — PROGRESS NOTES
PHYSICAL THERAPY EVALUATION/DISCHARGE    Patient: Clary Mcneill (73 y.o. female)  Date: 2024  Primary Diagnosis: Influenza A [J10.1]  Elevated serum creatinine [R79.89]  Elevated CK [R74.8]  Altered mental status, unspecified altered mental status type [R41.82]  Acute metabolic encephalopathy [G93.41]       Precautions: Fall Risk                      ASSESSMENT AND RECOMMENDATIONS:  Based on the objective data below, the patient presents with no further acute or post-acute therapy needs, s/p admission for AMS and (+) for influenza. On evaluation, patient was alert and oriented x 4 and reports resolution of admitting symptoms aside from increased hip pain. Patient completed bed mobility, transfers, and gait training with independence with no loss of balance or need for DME. Patient appears to be at baseline. Will complete orders. Please consult if there is a change in status.     Functional Outcome Measure:  The patient scored 24 on the [facility] outcome measure which is indicative of reduced odds of requiring post acute SNF/IPR upon d/c.      Further skilled acute physical therapy is not indicated at this time.       PLAN :  Recommendation for discharge: (in order for the patient to meet his/her long term goals): No skilled physical therapy    Other factors to consider for discharge: no additional factors    IF patient discharges home will need the following DME: none       SUBJECTIVE:   Patient pleasant and cooperative    OBJECTIVE DATA SUMMARY:     Past Medical History:   Diagnosis Date    Arthritis     Back    Hypercholesterolemia     Hypothyroid     Sciatic pain     hip     Past Surgical History:   Procedure Laterality Date     SECTION      x2    DILATION AND CURETTAGE OF UTERUS      x1    GYN  2015    CARROLL Chong       Home Situation and Prior Level of Function: independent  Social/Functional History  Lives With: Spouse  Type of Home: House  Home Layout: One level  Home Access: Stairs to enter  Method: Verbal  Barriers to Learning: None  Education Outcome: Verbalized understanding;Demonstrated understanding    Thank you for this referral.  Capo Price, PT  Minutes: 21      Physical Therapy Evaluation Charge Determination   History Examination Presentation Decision-Making   LOW Complexity : Zero comorbidities / personal factors that will impact the outcome / POC LOW Complexity : 1-2 Standardized tests and measures addressing body structure, function, activity limitation and / or participation in recreation  LOW Complexity : Stable, uncomplicated  AM-PAC  LOW      Based on the above components, the patient evaluation is determined to be of the following complexity level: Low

## 2024-01-18 NOTE — CONSULTS
never smoked. She has never used smokeless tobacco. She reports that she does not drink alcohol and does not use drugs.    =====================================    Allergies   Allergen Reactions    Minocycline Other (See Comments)     dysphagia    Moxifloxacin Shortness Of Breath    Motrin [Ibuprofen] Other (See Comments)     Stomach upset     MEDICATIONS    Current Facility-Administered Medications:     [START ON 1/19/2024] enoxaparin (LOVENOX) injection 40 mg, 40 mg, SubCUTAneous, Daily, Braden Santamaria MD    oseltamivir (TAMIFLU) capsule 30 mg, 30 mg, Oral, BID, Braden Santamaria MD    clopidogrel (PLAVIX) tablet 75 mg, 75 mg, Oral, Daily, Esteban Velasco MD    atorvastatin (LIPITOR) tablet 20 mg, 20 mg, Oral, Nightly, Esteban Velasco MD    0.9 % sodium chloride infusion, , IntraVENous, Continuous, Wolf Aragon MD, Last Rate: 100 mL/hr at 01/18/24 1554, New Bag at 01/18/24 1554    sodium chloride flush 0.9 % injection 5-40 mL, 5-40 mL, IntraVENous, 2 times per day, Wolf Aragon MD, 10 mL at 01/18/24 1013    sodium chloride flush 0.9 % injection 5-40 mL, 5-40 mL, IntraVENous, PRN, Wolf Aragon MD    0.9 % sodium chloride infusion, , IntraVENous, PRN, Wolf rAagon MD    ondansetron (ZOFRAN) injection 4 mg, 4 mg, IntraVENous, Q6H PRN, Wolf Aragon MD    sennosides-docusate sodium (SENOKOT-S) 8.6-50 MG tablet 1 tablet, 1 tablet, Oral, BID, Wolf Aragon MD    polyethylene glycol (GLYCOLAX) packet 17 g, 17 g, Oral, Daily PRN, Wolf Aragon MD    famotidine (PEPCID) 20 mg in sodium chloride (PF) 0.9 % 10 mL injection, 20 mg, IntraVENous, Daily, Wolf Aragon MD    acetaminophen (TYLENOL) tablet 650 mg, 650 mg, Oral, Q6H PRN, 650 mg at 01/17/24 3802 **OR** acetaminophen (TYLENOL) suppository 650 mg, 650 mg, Rectal, Q6H PRN, Wolf Aragon MD    ipratropium 0.5 mg-albuterol 2.5 mg (DUONEB) nebulizer solution 1 Dose, 1 Dose, Inhalation, Q4H PRN, Wolf Aragon MD    guaiFENesin (MUCINEX) extended release tablet 600 mg, 600 mg,  regarding minor surgery (Moderate Risk):      [] Yes      [] No

## 2024-01-18 NOTE — PROGRESS NOTES
OCCUPATIONAL THERAPY EVALUATION/DISCHARGE  Patient: Clary Mcneill (73 y.o. female)  Date: 2024  Primary Diagnosis: Influenza A [J10.1]  Elevated serum creatinine [R79.89]  Elevated CK [R74.8]  Altered mental status, unspecified altered mental status type [R41.82]  Acute metabolic encephalopathy [G93.41]         Precautions: Fall Risk                  ASSESSMENT :  Note patient, independent and living at home with her  PTA, admitted for AMS/ fever/ lethargy per chart, positive for flu A.  Patient presents today for OT evaluation A&Ox4 with clear mentation.  She demonstrated WFL AROM/ strength/ coordination for ADLs and performed the following independently with intact balance: sit-stand, ambulation to/ from bathroom without AD, toileting, and standing grooming.  She appropriately reported no ADL concerns.  Recommend return home at d/c with no additional OT needs.    Functional Outcome Measure:  The patient scored 0% on the AM-PAC ADL outcome measure which is indicative of no ADL impairment.      Further skilled acute occupational therapy is not indicated at this time.     PLAN :    Recommendation for discharge: (in order for the patient to meet his/her long term goals): No skilled occupational therapy       SUBJECTIVE:   Patient stated, “I'm okay.”    OBJECTIVE DATA SUMMARY:     Past Medical History:   Diagnosis Date    Arthritis     Back    Hypercholesterolemia     Hypothyroid     Sciatic pain     hip     Past Surgical History:   Procedure Laterality Date     SECTION      x2    DILATION AND CURETTAGE OF UTERUS      x1    GYN      CARROLL Chong       Prior Level of Function/Environment/Context:   , ADL Assistance: Independent,  ,  ,  ,  ,  , Homemaking Assistance: Independent, Ambulation Assistance: Independent, Transfer Assistance: Independent, Active : Yes     Expanded or extensive additional review of patient history:   Lives With: Spouse  Type of Home: House  Home Layout: One  None  How much help is needed for bathing (which includes washing, rinsing, drying)?: None  How much help is needed for toileting (which includes using toilet, bedpan, or urinal)?: None  How much help is needed for putting on and taking off regular upper body clothing?: None  How much help is needed for taking care of personal grooming?: None  How much help for eating meals?: None  AM-PAC Inpatient Daily Activity Raw Score: 24  AM-PAC Inpatient ADL T-Scale Score : 57.54  ADL Inpatient CMS 0-100% Score: 0  ADL Inpatient CMS G-Code Modifier : CH     Interpretation of Tool:  Represents clinically-significant functional categories (i.e. Activities of daily living).  Cutoff score 39.4 (19) correlates to a good likelihood of discharging home versus a facility  Fe Naylor, Sharonda Concepcion, Lanre Alonso, Delmi Fontaine, Woody Sánchez, Drew Naylor, AM-PAC “6-Clicks” Functional Assessment Scores Predict Acute Care Hospital Discharge Destination, Physical Therapy, Volume 94, Issue 9, 1 September 2014, Pages 3918-3735, [email or web address]       Pain Rating:  Patient did not report pain    Activity Tolerance:   Good    After treatment:   Standing/ walking with PT for further assessment    COMMUNICATION/EDUCATION:   The patient's plan of care was discussed with: physical therapist and registered nurse         Thank you for this referral.  Derek Bolivar OT  Minutes: 10    Occupational Therapy Evaluation Charge Determination   History Examination Decision-Making   LOW Complexity : Brief history review  LOW Complexity: 1-3 Performance deficits relating to physical, cognitive, or psychosocial skills that result in activity limitations and/or participation restrictions LOW Complexity: No comorbidities that affect functional and  no verbal  or physical assist needed to complete eval tasks   Based on the above components, the patient evaluation is determined to be of the following complexity

## 2024-01-18 NOTE — CARE COORDINATION
1/18/2024  11:49 AM      ICD-10-CM    1. Altered mental status, unspecified altered mental status type  R41.82       2. Influenza A  J10.1       3. Elevated serum creatinine  R79.89       4. Elevated CK  R74.8             RUR: 9%      CM reviewed EMR. No CM needs indicated at this time. Providers, if needs arise, please consult case management.   DEANDRA Najera        01/18/24 1144   Service Assessment   History Provided By Medical Record   Discharge Planning   Type of Residence House   Patient expects to be discharged to: House   Services At/After Discharge   Transition of Care Consult (CM Consult) N/A   Services At/After Discharge None    Resource Information Provided? No

## 2024-01-18 NOTE — PROGRESS NOTES
Nutrition Note    BPA noted for meal preferences. No known food allergies or [employer] dietary needs identified. Added note to diet order.  Pt will be screened for assessment per protocol.     Electronically signed by ARMOND SHELL MS, RD on 1/18/24 at 8:08 AM EST  Contact via [employer] or office [phone]

## 2024-01-18 NOTE — ED NOTES
Code S cancelled per Dr. Ricks.   
H2H at bedside. Pt report, EKG, facesheet, and summary given to H2. Pt travels on monitor with 20g in rt ac and 20g in lft forearm.  Pt is A&O x 4 and has been treated with tylenol for fever.  
Per DR. Ricks, NPO order discontinued.   
Pt appears to be more alert and her speech is no longer slurred. However, pt is having trouble with retrieving her thoughts.   
Pt has drank 2 cups of water with permission from Dr. Ricks.   
TRANSFER - OUT REPORT:    Verbal report given to  on Clary Mcneill  being transferred to [facility] for routine progression of patient care       Report consisted of patient's Situation, Background, Assessment and   Recommendations(SBAR).     Information from the following report(s) Nurse Handoff Report, Index, ED Encounter Summary, ED SBAR, MAR, and Recent Results was reviewed with the receiving nurse.    [place] Fall Assessment:    Presents to emergency department  because of falls (Syncope, seizure, or loss of consciousness): Yes  Age > 70: Yes  Altered Mental Status, Intoxication with alcohol or substance confusion (Disorientation, impaired judgment, poor safety awaremess, or inability to follow instructions): Yes  Impaired Mobility: Ambulates or transfers with assistive devices or assistance; Unable to ambulate or transer.: Yes  Nursing Judgement: Yes          Lines:   Peripheral IV 01/17/24 Right Antecubital (Active)   Site Assessment Clean, dry & intact 01/17/24 2108   Line Status Blood return noted 01/17/24 2108   Line Care Cap changed 01/17/24 2108   Phlebitis Assessment No symptoms 01/17/24 2108   Infiltration Assessment 0 01/17/24 2108   Alcohol Cap Used Yes 01/17/24 2108   Dressing Status Clean, dry & intact 01/17/24 2108   Dressing Type Transparent 01/17/24 2108   Dressing Intervention New 01/17/24 2108       Peripheral IV 01/17/24 Left;Anterior Forearm (Active)   Site Assessment Clean, dry & intact 01/17/24 2109   Line Status Blood return noted 01/17/24 2109   Line Care Cap changed 01/17/24 2109   Phlebitis Assessment No symptoms 01/17/24 2109   Infiltration Assessment 0 01/17/24 2109   Alcohol Cap Used Yes 01/17/24 2109   Dressing Status Clean, dry & intact 01/17/24 2109   Dressing Type Transparent 01/17/24 2109   Dressing Intervention New 01/17/24 2109        Opportunity for questions and clarification was provided.      Patient transported with:  Monitor, 20g in rt ac, 20g in left forearm, normal 
Teleneuro called for Code Stroke Level 2  Radiology notified of Code Stroke Level 2  Hospital switchboard called for Code Stroke Neuro level 2  
labral tear

## 2024-01-18 NOTE — PROGRESS NOTES
Pharmacy Dosing Services: 1/17/24  Tamiflu dose change per renal protocol  Physician Dr. Aragon  Indication: Flu    Serum Creatinine Lab Results   Component Value Date/Time    CREATININE 1.47 01/17/2024 08:13 PM      Creatinine Clearance Estimated Creatinine Clearance: 28 mL/min (A) (based on SCr of 1.47 mg/dL (H)).   BUN Lab Results   Component Value Date/Time    BUN 18 01/17/2024 08:13 PM         Current regimen: 30 mg bid  New regimen: 30 mg daily    Thank you  [phone]

## 2024-01-18 NOTE — H&P
[facility]  [address] [place] [address] [phone]        Hospitalist Admission History and Physical      NAME:  Clary Mcneill   :   1950   MRN:  [id number]     PCP:  Dangelo Stern MD     Date/Time of service:  2024  12:15 AM        Subjective:     CHIEF COMPLAINT: weakness     HISTORY OF PRESENT ILLNESS:     The patient is a 74 yo hx of hypothyroid, presented w/ AMS, weakness, rhabdo, Flu A infection.  The patient c/o diffused weakness for 2 days, associated with confusion, dry cough, fevers.  She denied chest pain, nausea, vomiting, diarrhea.  In the ED, WBC was 7.7.  .  Flu A positive.  Head CT unremarkable.  CT chest/abd unremarkable.      Allergies   Allergen Reactions    Minocycline Other (See Comments)     dysphagia    Moxifloxacin Shortness Of Breath    Motrin [Ibuprofen] Other (See Comments)     Stomach upset       Prior to Admission medications    Medication Sig Start Date End Date Taking? Authorizing Provider   levothyroxine (SYNTHROID) 25 MCG tablet Take 1 tablet by mouth Daily   Yes Leyla Persaud MD   Cholecalciferol (VITAMIN D3) 50 MCG (2000) CAPS Take by mouth   Yes Leyla Persaud MD       Past Medical History:   Diagnosis Date    Arthritis     Back    Hypercholesterolemia     Hypothyroid     Sciatic pain     hip        Past Surgical History:   Procedure Laterality Date     SECTION      x2    DILATION AND CURETTAGE OF UTERUS      x1    GYN      CARROLL Chong       Social History     Tobacco Use    Smoking status: Never    Smokeless tobacco: Never   Substance Use Topics    Alcohol use: No        Family History   Problem Relation Age of Onset    Hypertension Mother     Elevated Lipids Sister     Osteoarthritis Brother     Hypertension Sister     Osteoarthritis Mother     Elevated Lipids Mother     Diabetes Neg Hx     Osteoarthritis Father     Hypertension Father     Cancer Neg Hx     Thyroid

## 2024-01-18 NOTE — PROGRESS NOTES
[facility] Lovenox Dosing 01/18/24  Lovenox dose change per protocol  Physician: Dr Santamaria    [facility] Protocol  Enoxaparin prophylaxis dosing (medically ill, surgical patients)   Patient Weight (kg)     50 and below 51 - 100 101 - 150 151 - 174 175 or greater         Estimated CrCl  (ml/min) 30 or greater   30 mg SUBQ daily   40 mg SUBQ daily (or 30 mg BID for ortho) 30 mg SUBQ BID  40 mg SUBQ BID 60mg SUBQ BID      15-29 UFH 5000 units SUBQ BID   30 mg SUBQ daily 30 mg SUBQ daily 40 mg SUBQ daily   60 mg SUBQ daily      Less than 15 or Dialysis UFH 5000 units SUBQ BID   UFH 5000 units SUBQ TID UFH 7500 units SUBQ TID     Estimated Creatinine Clearance: 32 mL/min (A) (based on SCr of 1.29 mg/dL (H)).  Current dose: Lovenox 30 mg SC daily  Recommendation: Lovenox 40 mg SC daily    Pharmacist  Lety Akers, PharmD   [phone]

## 2024-01-19 VITALS
SYSTOLIC BLOOD PRESSURE: 144 MMHG | DIASTOLIC BLOOD PRESSURE: 70 MMHG | RESPIRATION RATE: 16 BRPM | HEIGHT: 60 IN | WEIGHT: 147 LBS | OXYGEN SATURATION: 95 % | TEMPERATURE: 97.9 F | BODY MASS INDEX: 28.86 KG/M2 | HEART RATE: 57 BPM

## 2024-01-19 PROCEDURE — 6360000002 HC RX W HCPCS: Performed by: HOSPITALIST

## 2024-01-19 PROCEDURE — 2580000003 HC RX 258: Performed by: INTERNAL MEDICINE

## 2024-01-19 PROCEDURE — 6370000000 HC RX 637 (ALT 250 FOR IP): Performed by: HOSPITALIST

## 2024-01-19 PROCEDURE — 6370000000 HC RX 637 (ALT 250 FOR IP): Performed by: INTERNAL MEDICINE

## 2024-01-19 PROCEDURE — 6370000000 HC RX 637 (ALT 250 FOR IP): Performed by: PSYCHIATRY & NEUROLOGY

## 2024-01-19 RX ORDER — ATORVASTATIN CALCIUM 20 MG/1
20 TABLET, FILM COATED ORAL NIGHTLY
Qty: 30 TABLET | Refills: 3 | Status: SHIPPED | OUTPATIENT
Start: 2024-01-19

## 2024-01-19 RX ORDER — SODIUM CHLORIDE AND POTASSIUM CHLORIDE 150; 900 MG/100ML; MG/100ML
INJECTION, SOLUTION INTRAVENOUS CONTINUOUS
Status: DISCONTINUED | OUTPATIENT
Start: 2024-01-19 | End: 2024-01-19 | Stop reason: HOSPADM

## 2024-01-19 RX ORDER — POTASSIUM CHLORIDE 20 MEQ/1
20 TABLET, EXTENDED RELEASE ORAL 2 TIMES DAILY
Qty: 6 TABLET | Refills: 0 | Status: SHIPPED | OUTPATIENT
Start: 2024-01-19 | End: 2024-01-22

## 2024-01-19 RX ORDER — CLOPIDOGREL BISULFATE 75 MG/1
75 TABLET ORAL DAILY
Qty: 30 TABLET | Refills: 3 | Status: SHIPPED | OUTPATIENT
Start: 2024-01-20

## 2024-01-19 RX ORDER — POTASSIUM CHLORIDE 750 MG/1
40 TABLET, FILM COATED, EXTENDED RELEASE ORAL
Status: DISPENSED | OUTPATIENT
Start: 2024-01-19 | End: 2024-01-19

## 2024-01-19 RX ADMIN — SODIUM CHLORIDE: 9 INJECTION, SOLUTION INTRAVENOUS at 04:50

## 2024-01-19 RX ADMIN — OSELTAMIVIR 30 MG: 30 CAPSULE ORAL at 08:49

## 2024-01-19 RX ADMIN — LEVOTHYROXINE SODIUM 25 MCG: 0.03 TABLET ORAL at 06:40

## 2024-01-19 RX ADMIN — GUAIFENESIN 600 MG: 600 TABLET ORAL at 08:49

## 2024-01-19 RX ADMIN — SODIUM CHLORIDE, PRESERVATIVE FREE 10 ML: 5 INJECTION INTRAVENOUS at 08:52

## 2024-01-19 RX ADMIN — CLOPIDOGREL BISULFATE 75 MG: 75 TABLET ORAL at 08:51

## 2024-01-19 RX ADMIN — POTASSIUM CHLORIDE AND SODIUM CHLORIDE: 900; 150 INJECTION, SOLUTION INTRAVENOUS at 15:32

## 2024-01-19 RX ADMIN — POTASSIUM CHLORIDE AND SODIUM CHLORIDE: 900; 150 INJECTION, SOLUTION INTRAVENOUS at 06:37

## 2024-01-19 RX ADMIN — ACETAMINOPHEN 650 MG: 325 TABLET ORAL at 08:49

## 2024-01-19 RX ADMIN — POTASSIUM CHLORIDE 40 MEQ: 750 TABLET, FILM COATED, EXTENDED RELEASE ORAL at 08:49

## 2024-01-19 RX ADMIN — POTASSIUM BICARBONATE 40 MEQ: 782 TABLET, EFFERVESCENT ORAL at 18:12

## 2024-01-19 NOTE — PLAN OF CARE
Problem: Discharge Planning  Goal: Discharge to home or other facility with appropriate resources  1/19/2024 1759 by Tammie Chappell LPN  Outcome: HH/HSPC Resolved Met  1/19/2024 0908 by Tammie Chappell LPN  Outcome: HH/HSPC Progressing  Flowsheets (Taken 1/19/2024 0900)  Discharge to home or other facility with appropriate resources: Identify barriers to discharge with patient and caregiver     Problem: Safety - Adult  Goal: Free from fall injury  1/19/2024 1759 by Tammie Chappell LPN  Outcome: HH/HSPC Resolved Met  1/19/2024 0908 by Tammie Chappell LPN  Outcome: HH/HSPC Progressing     Problem: ABCDS Injury Assessment  Goal: Absence of physical injury  1/19/2024 1759 by Tammie Chappell LPN  Outcome: HH/HSPC Resolved Met  1/19/2024 0908 by Tammie Chappell LPN  Outcome: HH/HSPC Progressing

## 2024-01-19 NOTE — PROGRESS NOTES
Nurse handed patient a copy of discharge instructions which have been read and explained to patient. New medications were read and explained to patient, patient verbalized understanding. Patient aware that prescriptions have been electronically sent to their pharmacy. Opportunity for questions and clarification offered. Removed patient's IV access with no complications. Vital signs stable. Patient sent with all belongings.

## 2024-01-19 NOTE — PLAN OF CARE
Problem: Discharge Planning  Goal: Discharge to home or other facility with appropriate resources  Outcome: /[facility] Progressing  Flowsheets (Taken 1/19/2024 0900)  Discharge to home or other facility with appropriate resources: Identify barriers to discharge with patient and caregiver     Problem: Safety - Adult  Goal: Free from fall injury  Outcome: /[facility] Progressing     Problem: ABCDS Injury Assessment  Goal: Absence of physical injury  Outcome: /[facility] Progressing

## 2024-01-19 NOTE — DISCHARGE INSTRUCTIONS
Patient Discharge Instructions    Clary Mcneill / [id number] : 1950    Admitted 2024 Discharged: 2024       Discharge Medications:   It is important that you take the medication exactly as they are prescribed.   Keep your medication in the bottles provided by the pharmacist and keep a list of the medication names, dosages, and times to be taken in your wallet.   Do not take other medications without consulting your doctor.   Call your PCP for medication refills      What to do at Home    Take medications as prescribed and follow up with PCP.  Call your PCP for refills of your medications.      Recommended Diet:  ADULT DIET; Regular; prefers Chicken or turkey       Recommended Activity: Activity as tolerated    Follow up with PCP and recheck potassium level.    **If you experience any of the following symptoms chest pain, shortness of breath, fevers, chills, and symptoms of stroke, please follow up with PCP or go to the nearest ER.**           Braden Santamaria MD     2024

## 2024-01-21 LAB
BACTERIA SPEC CULT: NORMAL
BACTERIA SPEC CULT: NORMAL
SERVICE CMNT-IMP: NORMAL
SERVICE CMNT-IMP: NORMAL

## 2024-02-16 PROBLEM — J10.1 INFLUENZA A: Status: RESOLVED | Noted: 2024-01-17 | Resolved: 2024-02-16

## 2024-04-09 ENCOUNTER — OFFICE VISIT (OUTPATIENT)
Age: 74
End: 2024-04-09
Payer: MEDICARE

## 2024-04-09 VITALS
OXYGEN SATURATION: 98 % | WEIGHT: 150.2 LBS | HEART RATE: 53 BPM | DIASTOLIC BLOOD PRESSURE: 78 MMHG | HEIGHT: 60 IN | BODY MASS INDEX: 29.49 KG/M2 | TEMPERATURE: 98 F | SYSTOLIC BLOOD PRESSURE: 114 MMHG

## 2024-04-09 DIAGNOSIS — I67.9 INTRACRANIAL VASCULAR STENOSIS: Primary | ICD-10-CM

## 2024-04-09 PROCEDURE — G8419 CALC BMI OUT NRM PARAM NOF/U: HCPCS | Performed by: PSYCHIATRY & NEUROLOGY

## 2024-04-09 PROCEDURE — G8427 DOCREV CUR MEDS BY ELIG CLIN: HCPCS | Performed by: PSYCHIATRY & NEUROLOGY

## 2024-04-09 PROCEDURE — 1123F ACP DISCUSS/DSCN MKR DOCD: CPT | Performed by: PSYCHIATRY & NEUROLOGY

## 2024-04-09 PROCEDURE — 99203 OFFICE O/P NEW LOW 30 MIN: CPT | Performed by: PSYCHIATRY & NEUROLOGY

## 2024-04-09 PROCEDURE — 1090F PRES/ABSN URINE INCON ASSESS: CPT | Performed by: PSYCHIATRY & NEUROLOGY

## 2024-04-09 PROCEDURE — G8400 PT W/DXA NO RESULTS DOC: HCPCS | Performed by: PSYCHIATRY & NEUROLOGY

## 2024-04-09 PROCEDURE — 3017F COLORECTAL CA SCREEN DOC REV: CPT | Performed by: PSYCHIATRY & NEUROLOGY

## 2024-04-09 PROCEDURE — 1036F TOBACCO NON-USER: CPT | Performed by: PSYCHIATRY & NEUROLOGY

## 2024-04-09 NOTE — PROGRESS NOTES
New patient referred by [facility] presenting with Intracranial stenosis.  Spouse at visit.  Patient reports for the past 3-4 years she has had pain at the base of her head intermittently.  Reports history of vertigo.  Denies headaches, dizziness, blurred or double vision.  No new problems voiced.

## 2024-04-09 NOTE — PROGRESS NOTES
New Patient Visit         CONSULT INFORMATION:  Date of Service: 2024  Consultation Requested by: hospital    PATIENT IDENTIFICATION:  Patient Name: Clary Mcneill  : 1950      CC: stenosis    HISTORY OF PRESENT ILLNESS:  73 y.o. RH White (non-) [1]female referred for intracranial stenoses. Was admited in  at [facility] with encephalopathy. CT amd CTA done intially to eval for stroke. This showed mild-mod stenoses of bilaeral distal PCA's. She was deemed not to have a stroke clinically and MRI was not done. She was stated on plavix (d/t sensitivity to NSAIDs) and statin. She denies any focal neuro deficits.        Past Medical History:   Diagnosis Date    Arthritis     Back    Hypercholesterolemia     Hypothyroid     Sciatic pain     hip       Past Surgical History:   Procedure Laterality Date     SECTION      x2    DILATION AND CURETTAGE OF UTERUS      x1    GYN  2015    CARROLL Chong       Current Outpatient Medications   Medication Sig    potassium chloride (KLOR-CON M) 20 MEQ extended release tablet Take 1 tablet by mouth 2 times daily for 3 days    clopidogrel (PLAVIX) 75 MG tablet Take 1 tablet by mouth daily    atorvastatin (LIPITOR) 20 MG tablet Take 1 tablet by mouth nightly    levothyroxine (SYNTHROID) 25 MCG tablet Take 1 tablet by mouth Daily    Cholecalciferol (VITAMIN D3) 50 MCG (2000) CAPS Take by mouth     No current facility-administered medications for this visit.       Allergies   Allergen Reactions    Minocycline Other (See Comments)     dysphagia    Moxifloxacin Shortness Of Breath    Motrin [Ibuprofen] Other (See Comments)     Stomach upset         Social History  Social History     Socioeconomic History    Marital status:      Spouse name: Not on file    Number of children: Not on file    Years of education: Not on file    Highest education level: Not on file   Occupational History    Not on file   Tobacco Use    Smoking status: Never    Smokeless

## 2024-05-16 ENCOUNTER — TELEPHONE (OUTPATIENT)
Age: 74
End: 2024-05-16

## 2025-08-05 ENCOUNTER — HOSPITAL ENCOUNTER (OUTPATIENT)
Facility: HOSPITAL | Age: 75
Setting detail: RECURRING SERIES
Discharge: HOME | End: 2025-08-08
Payer: MEDICARE

## 2025-08-05 PROCEDURE — 97161 PT EVAL LOW COMPLEX 20 MIN: CPT

## 2025-08-05 PROCEDURE — 97110 THERAPEUTIC EXERCISES: CPT

## 2025-08-05 PROCEDURE — 97535 SELF CARE MNGMENT TRAINING: CPT
